# Patient Record
Sex: FEMALE | Race: WHITE | NOT HISPANIC OR LATINO | URBAN - METROPOLITAN AREA
[De-identification: names, ages, dates, MRNs, and addresses within clinical notes are randomized per-mention and may not be internally consistent; named-entity substitution may affect disease eponyms.]

---

## 2017-01-15 ENCOUNTER — EMERGENCY (EMERGENCY)
Facility: HOSPITAL | Age: 27
LOS: 1 days | Discharge: HOME | End: 2017-01-15
Attending: EMERGENCY MEDICINE
Payer: COMMERCIAL

## 2017-01-15 VITALS
TEMPERATURE: 99 F | HEART RATE: 86 BPM | OXYGEN SATURATION: 99 % | DIASTOLIC BLOOD PRESSURE: 73 MMHG | RESPIRATION RATE: 18 BRPM | SYSTOLIC BLOOD PRESSURE: 109 MMHG

## 2017-01-15 DIAGNOSIS — Z3A.01 LESS THAN 8 WEEKS GESTATION OF PREGNANCY: ICD-10-CM

## 2017-01-15 DIAGNOSIS — O43.891 OTHER PLACENTAL DISORDERS, FIRST TRIMESTER: ICD-10-CM

## 2017-01-15 DIAGNOSIS — O21.9 VOMITING OF PREGNANCY, UNSPECIFIED: ICD-10-CM

## 2017-01-15 DIAGNOSIS — R42 DIZZINESS AND GIDDINESS: ICD-10-CM

## 2017-01-15 LAB
ALBUMIN SERPL ELPH-MCNC: 4.2 G/DL — SIGNIFICANT CHANGE UP (ref 3.3–5.2)
ALP SERPL-CCNC: 37 U/L — LOW (ref 40–120)
ALT FLD-CCNC: 12 U/L — SIGNIFICANT CHANGE UP
ANION GAP SERPL CALC-SCNC: 11 MMOL/L — SIGNIFICANT CHANGE UP (ref 5–17)
APPEARANCE UR: CLEAR — SIGNIFICANT CHANGE UP
AST SERPL-CCNC: 15 U/L — SIGNIFICANT CHANGE UP
BILIRUB SERPL-MCNC: 0.3 MG/DL — LOW (ref 0.4–2)
BILIRUB UR-MCNC: NEGATIVE — SIGNIFICANT CHANGE UP
BLD GP AB SCN SERPL QL: SIGNIFICANT CHANGE UP
BUN SERPL-MCNC: 8 MG/DL — SIGNIFICANT CHANGE UP (ref 8–20)
CALCIUM SERPL-MCNC: 9.7 MG/DL — SIGNIFICANT CHANGE UP (ref 8.6–10.2)
CHLORIDE SERPL-SCNC: 101 MMOL/L — SIGNIFICANT CHANGE UP (ref 98–107)
CO2 SERPL-SCNC: 25 MMOL/L — SIGNIFICANT CHANGE UP (ref 22–29)
COLOR SPEC: YELLOW — SIGNIFICANT CHANGE UP
CREAT SERPL-MCNC: 0.49 MG/DL — LOW (ref 0.5–1.3)
DIFF PNL FLD: ABNORMAL
EPI CELLS # UR: SIGNIFICANT CHANGE UP
GLUCOSE SERPL-MCNC: 109 MG/DL — SIGNIFICANT CHANGE UP (ref 70–115)
GLUCOSE UR QL: NEGATIVE MG/DL — SIGNIFICANT CHANGE UP
HCG UR QL: POSITIVE
HCT VFR BLD CALC: 39.2 % — SIGNIFICANT CHANGE UP (ref 37–47)
HGB BLD-MCNC: 13.1 G/DL — SIGNIFICANT CHANGE UP (ref 12–16)
KETONES UR-MCNC: ABNORMAL
LEUKOCYTE ESTERASE UR-ACNC: ABNORMAL
MCHC RBC-ENTMCNC: 30.1 PG — SIGNIFICANT CHANGE UP (ref 27–31)
MCHC RBC-ENTMCNC: 33.4 G/DL — SIGNIFICANT CHANGE UP (ref 32–36)
MCV RBC AUTO: 90.1 FL — SIGNIFICANT CHANGE UP (ref 81–99)
NITRITE UR-MCNC: NEGATIVE — SIGNIFICANT CHANGE UP
PH UR: 6.5 — SIGNIFICANT CHANGE UP (ref 4.8–8)
PLATELET # BLD AUTO: 234 K/UL — SIGNIFICANT CHANGE UP (ref 150–400)
POTASSIUM SERPL-MCNC: 3.8 MMOL/L — SIGNIFICANT CHANGE UP (ref 3.5–5.3)
POTASSIUM SERPL-SCNC: 3.8 MMOL/L — SIGNIFICANT CHANGE UP (ref 3.5–5.3)
PROT SERPL-MCNC: 7.3 G/DL — SIGNIFICANT CHANGE UP (ref 6.6–8.7)
PROT UR-MCNC: 15 MG/DL
RBC # BLD: 4.35 M/UL — LOW (ref 4.4–5.2)
RBC # FLD: 13.8 % — SIGNIFICANT CHANGE UP (ref 11–15.6)
RBC CASTS # UR COMP ASSIST: ABNORMAL /HPF (ref 0–4)
SODIUM SERPL-SCNC: 137 MMOL/L — SIGNIFICANT CHANGE UP (ref 135–145)
SP GR SPEC: 1.02 — SIGNIFICANT CHANGE UP (ref 1.01–1.02)
TYPE + AB SCN PNL BLD: SIGNIFICANT CHANGE UP
UROBILINOGEN FLD QL: 1 MG/DL
WBC # BLD: 9.22 K/UL — SIGNIFICANT CHANGE UP (ref 4.8–10.8)
WBC # FLD AUTO: 9.22 K/UL — SIGNIFICANT CHANGE UP (ref 4.8–10.8)
WBC UR QL: SIGNIFICANT CHANGE UP

## 2017-01-15 PROCEDURE — 84702 CHORIONIC GONADOTROPIN TEST: CPT

## 2017-01-15 PROCEDURE — 76817 TRANSVAGINAL US OBSTETRIC: CPT | Mod: 26

## 2017-01-15 PROCEDURE — 81001 URINALYSIS AUTO W/SCOPE: CPT

## 2017-01-15 PROCEDURE — 76817 TRANSVAGINAL US OBSTETRIC: CPT

## 2017-01-15 PROCEDURE — 86900 BLOOD TYPING SEROLOGIC ABO: CPT

## 2017-01-15 PROCEDURE — 76801 OB US < 14 WKS SINGLE FETUS: CPT | Mod: 26

## 2017-01-15 PROCEDURE — 86850 RBC ANTIBODY SCREEN: CPT

## 2017-01-15 PROCEDURE — 85027 COMPLETE CBC AUTOMATED: CPT

## 2017-01-15 PROCEDURE — 86901 BLOOD TYPING SEROLOGIC RH(D): CPT

## 2017-01-15 PROCEDURE — 81025 URINE PREGNANCY TEST: CPT

## 2017-01-15 PROCEDURE — 80053 COMPREHEN METABOLIC PANEL: CPT

## 2017-01-15 PROCEDURE — 99284 EMERGENCY DEPT VISIT MOD MDM: CPT | Mod: 25

## 2017-01-15 PROCEDURE — 76801 OB US < 14 WKS SINGLE FETUS: CPT

## 2017-01-15 PROCEDURE — 99284 EMERGENCY DEPT VISIT MOD MDM: CPT

## 2017-01-15 NOTE — ED ADULT TRIAGE NOTE - CHIEF COMPLAINT QUOTE
Patient arrived to ED today with c/o dizziness, and near syncopal episode this morning.  Patient has had light brown spotting, nausea and vomiting over the past couple of days.

## 2017-01-15 NOTE — ED STATDOCS - ATTENDING CONTRIBUTION TO CARE
I, Barber Gregory, performed the initial face to face bedside interview with this patient regarding history of present illness, review of symptoms and relevant past medical, social and family history.  I completed an independent physical examination.  I was the initial provider who evaluated this patient. I have signed out the follow up of any pending tests (i.e. labs, radiological studies) to the ACP.  I have communicated the patient’s plan of care and disposition with the ACP.  The history, relevant review of systems, past medical and surgical history, medical decision making, and physical examination was documented by the scribe in my presence and I attest to the accuracy of the documentation.

## 2017-01-15 NOTE — ED STATDOCS - OBJECTIVE STATEMENT
A 26 year old female pt presents to the ED c/o dizziness. The pt is 7 weeks pregnant and had a normal US on Jan 12 th that showed an IUP. Since then she has had an episode of vaginal bleeding with a clot being passed and associated cramps. This is his first pregnancy. The pt reports developing nausea and vomiting 1 day ago and also had been experiencing light brown spotting, which she was told by her Doctor was normal. She states that she was in the shower this morning and became extremely dizzy, almost passing out. Pt also reports passing a brown clot this morning. She has been experiencing mild abdominal cramping over the past few days. Pt is pregnant for the first time. No further complaints at this time. A 26 year old female pt presents to the ED c/o dizziness. The pt is 7 weeks pregnant and had a normal US on Jan 12 th that showed an IUP. The pt reports developing nausea and vomiting 1 day ago and also had been experiencing light brown spotting, which she was told by her Doctor was normal. She states that she was in the shower this morning and became extremely dizzy, almost passing out. Pt also reports passing a brown clot this morning. She has been experiencing mild abdominal cramping over the past few days. Pt is pregnant for the first time. No further complaints at this time.

## 2017-01-15 NOTE — ED STATDOCS - MEDICAL DECISION MAKING DETAILS
A 26 year oldThe pt is 7 weeks pregnant and had a normal US on Jan 12 th that showed an IUP. Since then she has had an episode of vaginal bleeding with a clot being passed and associated cramps. This is his first pregnancy. A 26 year old female pt presents to the ED c/o dizziness. The pt is 7 weeks pregnant and had a normal US on Jan 12 th that showed an IUP. Since then she has had an episode of vaginal bleeding with a clot being passed and associated cramps. This is his first pregnancy. Will check labs, US abd, and re-evaluate.

## 2017-01-15 NOTE — ED STATDOCS - NS ED MD SCRIBE ATTENDING SCRIBE SECTIONS
HIV/PHYSICAL EXAM/DISPOSITION/PAST MEDICAL/SURGICAL/SOCIAL HISTORY/REVIEW OF SYSTEMS/HISTORY OF PRESENT ILLNESS/VITAL SIGNS( Pullset)/INTAKE ASSESSMENT/SCREENINGS

## 2017-01-15 NOTE — ED STATDOCS - PROGRESS NOTE DETAILS
NP NOTE: Pt seen by intake physician and orders/plan evaluated.  Sub: pt  with vaginal bleeding, approx 7 weeks preg, no reported trauma, fever, urinary symptoms  Obj: awake, alert, oriented, in NAD, abd soft supple non-tender  Plan: reviewed labs, US with patient, [facility]  Discussed all results with patient. Pt agreebale with plan. Questions answered.

## 2017-01-28 PROBLEM — Z00.00 ENCOUNTER FOR PREVENTIVE HEALTH EXAMINATION: Status: ACTIVE | Noted: 2017-01-28

## 2017-02-14 ENCOUNTER — APPOINTMENT (OUTPATIENT)
Dept: ANTEPARTUM | Facility: CLINIC | Age: 27
End: 2017-02-14

## 2017-02-14 ENCOUNTER — ASOB RESULT (OUTPATIENT)
Age: 27
End: 2017-02-14

## 2017-03-09 ENCOUNTER — APPOINTMENT (OUTPATIENT)
Age: 27
End: 2017-03-09

## 2017-04-11 ENCOUNTER — APPOINTMENT (OUTPATIENT)
Dept: ANTEPARTUM | Facility: CLINIC | Age: 27
End: 2017-04-11

## 2017-04-11 ENCOUNTER — ASOB RESULT (OUTPATIENT)
Age: 27
End: 2017-04-11

## 2017-05-17 ENCOUNTER — EMERGENCY (EMERGENCY)
Facility: HOSPITAL | Age: 27
LOS: 1 days | Discharge: HOME | End: 2017-05-17
Attending: EMERGENCY MEDICINE | Admitting: EMERGENCY MEDICINE
Payer: COMMERCIAL

## 2017-05-17 ENCOUNTER — OUTPATIENT (OUTPATIENT)
Dept: OUTPATIENT SERVICES | Facility: HOSPITAL | Age: 27
LOS: 1 days | End: 2017-05-17
Payer: COMMERCIAL

## 2017-05-17 VITALS
SYSTOLIC BLOOD PRESSURE: 116 MMHG | TEMPERATURE: 98 F | RESPIRATION RATE: 17 BRPM | OXYGEN SATURATION: 100 % | DIASTOLIC BLOOD PRESSURE: 62 MMHG | HEART RATE: 100 BPM

## 2017-05-17 VITALS
TEMPERATURE: 98 F | SYSTOLIC BLOOD PRESSURE: 118 MMHG | OXYGEN SATURATION: 97 % | HEART RATE: 103 BPM | RESPIRATION RATE: 18 BRPM | DIASTOLIC BLOOD PRESSURE: 73 MMHG

## 2017-05-17 DIAGNOSIS — O26.899 OTHER SPECIFIED PREGNANCY RELATED CONDITIONS, UNSPECIFIED TRIMESTER: ICD-10-CM

## 2017-05-17 DIAGNOSIS — R00.0 TACHYCARDIA, UNSPECIFIED: ICD-10-CM

## 2017-05-17 DIAGNOSIS — R63.0 ANOREXIA: ICD-10-CM

## 2017-05-17 DIAGNOSIS — R55 SYNCOPE AND COLLAPSE: ICD-10-CM

## 2017-05-17 DIAGNOSIS — Z3A.00 WEEKS OF GESTATION OF PREGNANCY NOT SPECIFIED: ICD-10-CM

## 2017-05-17 DIAGNOSIS — Z3A.25 25 WEEKS GESTATION OF PREGNANCY: ICD-10-CM

## 2017-05-17 DIAGNOSIS — O99.89 OTHER SPECIFIED DISEASES AND CONDITIONS COMPLICATING PREGNANCY, CHILDBIRTH AND THE PUERPERIUM: ICD-10-CM

## 2017-05-17 DIAGNOSIS — R42 DIZZINESS AND GIDDINESS: ICD-10-CM

## 2017-05-17 LAB
ALBUMIN SERPL ELPH-MCNC: 3.8 G/DL — SIGNIFICANT CHANGE UP (ref 3.3–5)
ALP SERPL-CCNC: 45 U/L — SIGNIFICANT CHANGE UP (ref 40–120)
ALT FLD-CCNC: 9 U/L RC — LOW (ref 10–45)
ANION GAP SERPL CALC-SCNC: 14 MMOL/L — SIGNIFICANT CHANGE UP (ref 5–17)
APPEARANCE UR: CLEAR — SIGNIFICANT CHANGE UP
AST SERPL-CCNC: 14 U/L — SIGNIFICANT CHANGE UP (ref 10–40)
BASOPHILS # BLD AUTO: 0 K/UL — SIGNIFICANT CHANGE UP (ref 0–0.2)
BASOPHILS NFR BLD AUTO: 0.3 % — SIGNIFICANT CHANGE UP (ref 0–2)
BILIRUB SERPL-MCNC: 0.2 MG/DL — SIGNIFICANT CHANGE UP (ref 0.2–1.2)
BILIRUB UR-MCNC: NEGATIVE — SIGNIFICANT CHANGE UP
BUN SERPL-MCNC: 12 MG/DL — SIGNIFICANT CHANGE UP (ref 7–23)
CALCIUM SERPL-MCNC: 9.1 MG/DL — SIGNIFICANT CHANGE UP (ref 8.4–10.5)
CHLORIDE SERPL-SCNC: 104 MMOL/L — SIGNIFICANT CHANGE UP (ref 96–108)
CO2 SERPL-SCNC: 22 MMOL/L — SIGNIFICANT CHANGE UP (ref 22–31)
COLOR SPEC: YELLOW — SIGNIFICANT CHANGE UP
CREAT SERPL-MCNC: 0.56 MG/DL — SIGNIFICANT CHANGE UP (ref 0.5–1.3)
DIFF PNL FLD: NEGATIVE — SIGNIFICANT CHANGE UP
EOSINOPHIL # BLD AUTO: 0.1 K/UL — SIGNIFICANT CHANGE UP (ref 0–0.5)
EOSINOPHIL NFR BLD AUTO: 0.4 % — SIGNIFICANT CHANGE UP (ref 0–6)
EPI CELLS # UR: SIGNIFICANT CHANGE UP /HPF
GLUCOSE SERPL-MCNC: 113 MG/DL — HIGH (ref 70–99)
GLUCOSE UR QL: NEGATIVE — SIGNIFICANT CHANGE UP
HCT VFR BLD CALC: 35.3 % — SIGNIFICANT CHANGE UP (ref 34.5–45)
HGB BLD-MCNC: 12.4 G/DL — SIGNIFICANT CHANGE UP (ref 11.5–15.5)
HYALINE CASTS # UR AUTO: ABNORMAL
KETONES UR-MCNC: ABNORMAL
LEUKOCYTE ESTERASE UR-ACNC: ABNORMAL
LYMPHOCYTES # BLD AUTO: 16.9 % — SIGNIFICANT CHANGE UP (ref 13–44)
LYMPHOCYTES # BLD AUTO: 2.1 K/UL — SIGNIFICANT CHANGE UP (ref 1–3.3)
MCHC RBC-ENTMCNC: 33 PG — SIGNIFICANT CHANGE UP (ref 27–34)
MCHC RBC-ENTMCNC: 35.1 GM/DL — SIGNIFICANT CHANGE UP (ref 32–36)
MCV RBC AUTO: 94.1 FL — SIGNIFICANT CHANGE UP (ref 80–100)
MONOCYTES # BLD AUTO: 0.7 K/UL — SIGNIFICANT CHANGE UP (ref 0–0.9)
MONOCYTES NFR BLD AUTO: 5.8 % — SIGNIFICANT CHANGE UP (ref 2–14)
NEUTROPHILS # BLD AUTO: 9.6 K/UL — HIGH (ref 1.8–7.4)
NEUTROPHILS NFR BLD AUTO: 76.5 % — SIGNIFICANT CHANGE UP (ref 43–77)
NITRITE UR-MCNC: NEGATIVE — SIGNIFICANT CHANGE UP
PH UR: 6 — SIGNIFICANT CHANGE UP (ref 5–8)
PLATELET # BLD AUTO: 218 K/UL — SIGNIFICANT CHANGE UP (ref 150–400)
POTASSIUM SERPL-MCNC: 3.3 MMOL/L — LOW (ref 3.5–5.3)
POTASSIUM SERPL-SCNC: 3.3 MMOL/L — LOW (ref 3.5–5.3)
PROT SERPL-MCNC: 6.6 G/DL — SIGNIFICANT CHANGE UP (ref 6–8.3)
PROT UR-MCNC: 100 MG/DL
RBC # BLD: 3.75 M/UL — LOW (ref 3.8–5.2)
RBC # FLD: 12 % — SIGNIFICANT CHANGE UP (ref 10.3–14.5)
RBC CASTS # UR COMP ASSIST: SIGNIFICANT CHANGE UP /HPF (ref 0–2)
SODIUM SERPL-SCNC: 140 MMOL/L — SIGNIFICANT CHANGE UP (ref 135–145)
SP GR SPEC: >1.03 — HIGH (ref 1.01–1.02)
UROBILINOGEN FLD QL: 1
WBC # BLD: 12.5 K/UL — HIGH (ref 3.8–10.5)
WBC # FLD AUTO: 12.5 K/UL — HIGH (ref 3.8–10.5)
WBC UR QL: SIGNIFICANT CHANGE UP /HPF (ref 0–5)

## 2017-05-17 PROCEDURE — 93005 ELECTROCARDIOGRAM TRACING: CPT

## 2017-05-17 PROCEDURE — 80053 COMPREHEN METABOLIC PANEL: CPT

## 2017-05-17 PROCEDURE — 99284 EMERGENCY DEPT VISIT MOD MDM: CPT | Mod: 25

## 2017-05-17 PROCEDURE — 81001 URINALYSIS AUTO W/SCOPE: CPT

## 2017-05-17 PROCEDURE — 59025 FETAL NON-STRESS TEST: CPT

## 2017-05-17 PROCEDURE — 93010 ELECTROCARDIOGRAM REPORT: CPT

## 2017-05-17 PROCEDURE — 87086 URINE CULTURE/COLONY COUNT: CPT

## 2017-05-17 PROCEDURE — G0463: CPT

## 2017-05-17 PROCEDURE — 85027 COMPLETE CBC AUTOMATED: CPT

## 2017-05-17 RX ORDER — POTASSIUM CHLORIDE 20 MEQ
40 PACKET (EA) ORAL ONCE
Qty: 0 | Refills: 0 | Status: COMPLETED | OUTPATIENT
Start: 2017-05-17 | End: 2017-05-17

## 2017-05-17 RX ORDER — SODIUM CHLORIDE 9 MG/ML
1000 INJECTION INTRAMUSCULAR; INTRAVENOUS; SUBCUTANEOUS ONCE
Qty: 0 | Refills: 0 | Status: COMPLETED | OUTPATIENT
Start: 2017-05-17 | End: 2017-05-17

## 2017-05-17 RX ADMIN — SODIUM CHLORIDE 1000 MILLILITER(S): 9 INJECTION INTRAMUSCULAR; INTRAVENOUS; SUBCUTANEOUS at 04:32

## 2017-05-17 RX ADMIN — Medication 40 MILLIEQUIVALENT(S): at 06:26

## 2017-05-17 RX ADMIN — SODIUM CHLORIDE 1000 MILLILITER(S): 9 INJECTION INTRAMUSCULAR; INTRAVENOUS; SUBCUTANEOUS at 05:43

## 2017-05-17 NOTE — ED PROVIDER NOTE - OBJECTIVE STATEMENT
27yo female  at 25 weeks by US p/w lightheadedness for 1 day. 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 She reports throughout her shift she has felt lightheaded. She reports feeling somewhat dehydrated. Pt. has history of subchoronic hemorrhage during this pregnancy for which she was on bedrest. Denies fevers, dysuria, back pain, nausea, vomiting, chest pain, shortness of breath. 27yo female  at 25 weeks by US p/w lightheadedness for 1 day. 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 She reports throughout her shift she has felt lightheaded. She reports feeling somewhat dehydrated. Pt. has history of subchoronic hemorrhage during this pregnancy for which she was on bedrest. Denies fevers, dysuria, back pain, nausea, vomiting, chest pain, shortness of breath, vaginal bleeding. 25yo female  at 25 weeks by US p/w lightheadedness for 1 day. 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 She reports throughout her shift she has felt lightheaded. She reports feeling somewhat dehydrated. Pt. has history of subchoronic hemorrhage during this pregnancy for which she was on bedrest. Reports some decreased PO fluids recently. Denies fevers, dysuria, back pain, nausea, vomiting, chest pain, shortness of breath, vaginal bleeding. 25yo female  at 25 weeks by US p/w lightheadedness for 1 day. 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 She reports throughout her shift she has felt lightheaded. She reports feeling somewhat dehydrated. Pt. has history of subchoronic hematoma during this pregnancy for which she was on bedrest. Reports some decreased PO fluids recently. Denies fevers, dysuria, back pain, nausea, vomiting, chest pain, shortness of breath, vaginal bleeding.

## 2017-05-17 NOTE — ED PROVIDER NOTE - MEDICAL DECISION MAKING DETAILS
27yo female  at 25 weeks by US p/w lightheadedness for 1 day. 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 Decreased PO intake. PE: Neuro intact, tachycardia at 103. MOst likely 2/2 dehydration. WIll obtain cbc, cmp, urine, ekg, reassess.

## 2017-05-17 NOTE — ED PROVIDER NOTE - ATTENDING CONTRIBUTION TO CARE
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  Improved with squatting down.  Reporting limited water intake recently.  Denying HA, chest pains, abdominal pains, N/V, dysuria, vaginal bleeding.  Now reporting feeling improved.    On exam patient VS WNL, RRR S1/S2, CTABL, abdomen gravid, neurologically intact.    Possible dehydration/orthostatic near syncope - will check screening labs, FHR, IVF, EKG, UA - if remains well appearing and ED workup unremarkable will DC to L&D for fetal evaluation.

## 2017-05-17 NOTE — ED PROVIDER NOTE - PLAN OF CARE
You were seen in the Emergency Department for lightheadedness. Your examination and lab tests were reassuring. You were most likely dehydrated. Drink plenty of fluids. Please follow up with your obstetrician as soon as possible for further evaluation. Please return to the Emergency Department if you have any new concerning symptoms such as severe pain, weakness, vaginal bleeding, or any other concerning symptoms.

## 2017-05-17 NOTE — ED PROVIDER NOTE - CARE PLAN
Instructions for follow-up, activity and diet:	You were seen in the Emergency Department for lightheadedness. Your examination and lab tests were reassuring. You were most likely dehydrated. Drink plenty of fluids. Please follow up with your obstetrician as soon as possible for further evaluation. Please return to the Emergency Department if you have any new concerning symptoms such as severe pain, weakness, vaginal bleeding, or any other concerning symptoms. Principal Discharge DX:	Near syncope  Instructions for follow-up, activity and diet:	You were seen in the Emergency Department for lightheadedness. Your examination and lab tests were reassuring. You were most likely dehydrated. Drink plenty of fluids. Please follow up with your obstetrician as soon as possible for further evaluation. Please return to the Emergency Department if you have any new concerning symptoms such as severe pain, weakness, vaginal bleeding, or any other concerning symptoms.

## 2017-05-18 LAB
CULTURE RESULTS: SIGNIFICANT CHANGE UP
SPECIMEN SOURCE: SIGNIFICANT CHANGE UP

## 2017-07-03 ENCOUNTER — APPOINTMENT (OUTPATIENT)
Dept: ANTEPARTUM | Facility: CLINIC | Age: 27
End: 2017-07-03

## 2017-07-03 ENCOUNTER — ASOB RESULT (OUTPATIENT)
Age: 27
End: 2017-07-03

## 2017-07-27 ENCOUNTER — OUTPATIENT (OUTPATIENT)
Dept: OUTPATIENT SERVICES | Facility: HOSPITAL | Age: 27
LOS: 1 days | End: 2017-07-27
Payer: COMMERCIAL

## 2017-07-27 DIAGNOSIS — Z00.00 ENCOUNTER FOR GENERAL ADULT MEDICAL EXAMINATION WITHOUT ABNORMAL FINDINGS: ICD-10-CM

## 2017-07-27 PROCEDURE — 86480 TB TEST CELL IMMUN MEASURE: CPT

## 2017-07-27 PROCEDURE — 36415 COLL VENOUS BLD VENIPUNCTURE: CPT

## 2017-07-28 LAB
M TB TUBERC IFN-G BLD QL: 0.01 IU/ML — SIGNIFICANT CHANGE UP
M TB TUBERC IFN-G BLD QL: 0.06 IU/ML — SIGNIFICANT CHANGE UP
M TB TUBERC IFN-G BLD QL: NEGATIVE — SIGNIFICANT CHANGE UP
MITOGEN IGNF BCKGRD COR BLD-ACNC: >10 IU/ML — SIGNIFICANT CHANGE UP

## 2017-08-01 ENCOUNTER — APPOINTMENT (OUTPATIENT)
Dept: ANTEPARTUM | Facility: CLINIC | Age: 27
End: 2017-08-01

## 2017-08-16 ENCOUNTER — APPOINTMENT (OUTPATIENT)
Dept: ANTEPARTUM | Facility: CLINIC | Age: 27
End: 2017-08-16
Payer: COMMERCIAL

## 2017-08-16 ENCOUNTER — ASOB RESULT (OUTPATIENT)
Age: 27
End: 2017-08-16

## 2017-08-16 PROCEDURE — 76816 OB US FOLLOW-UP PER FETUS: CPT

## 2017-08-22 ENCOUNTER — INPATIENT (INPATIENT)
Facility: HOSPITAL | Age: 27
LOS: 2 days | Discharge: HOME | End: 2017-08-25
Attending: OBSTETRICS & GYNECOLOGY | Admitting: OBSTETRICS & GYNECOLOGY
Payer: COMMERCIAL

## 2017-08-22 DIAGNOSIS — Z34.80 ENCOUNTER FOR SUPERVISION OF OTHER NORMAL PREGNANCY, UNSPECIFIED TRIMESTER: ICD-10-CM

## 2017-08-22 DIAGNOSIS — O26.899 OTHER SPECIFIED PREGNANCY RELATED CONDITIONS, UNSPECIFIED TRIMESTER: ICD-10-CM

## 2017-08-22 DIAGNOSIS — Z3A.00 WEEKS OF GESTATION OF PREGNANCY NOT SPECIFIED: ICD-10-CM

## 2017-08-22 LAB
BASOPHILS # BLD AUTO: 0 K/UL — SIGNIFICANT CHANGE UP (ref 0–0.2)
BASOPHILS NFR BLD AUTO: 0.3 % — SIGNIFICANT CHANGE UP (ref 0–2)
EOSINOPHIL # BLD AUTO: 0.1 K/UL — SIGNIFICANT CHANGE UP (ref 0–0.5)
EOSINOPHIL NFR BLD AUTO: 0.5 % — SIGNIFICANT CHANGE UP (ref 0–6)
HCT VFR BLD CALC: 36.7 % — SIGNIFICANT CHANGE UP (ref 34.5–45)
HGB BLD-MCNC: 12.2 G/DL — SIGNIFICANT CHANGE UP (ref 11.5–15.5)
LYMPHOCYTES # BLD AUTO: 17.6 % — SIGNIFICANT CHANGE UP (ref 13–44)
LYMPHOCYTES # BLD AUTO: 2.4 K/UL — SIGNIFICANT CHANGE UP (ref 1–3.3)
MCHC RBC-ENTMCNC: 31.1 PG — SIGNIFICANT CHANGE UP (ref 27–34)
MCHC RBC-ENTMCNC: 33.4 GM/DL — SIGNIFICANT CHANGE UP (ref 32–36)
MCV RBC AUTO: 93.3 FL — SIGNIFICANT CHANGE UP (ref 80–100)
MONOCYTES # BLD AUTO: 0.8 K/UL — SIGNIFICANT CHANGE UP (ref 0–0.9)
MONOCYTES NFR BLD AUTO: 5.8 % — SIGNIFICANT CHANGE UP (ref 2–14)
NEUTROPHILS # BLD AUTO: 10.6 K/UL — HIGH (ref 1.8–7.4)
NEUTROPHILS NFR BLD AUTO: 75.8 % — SIGNIFICANT CHANGE UP (ref 43–77)
PLATELET # BLD AUTO: 186 K/UL — SIGNIFICANT CHANGE UP (ref 150–400)
RBC # BLD: 3.93 M/UL — SIGNIFICANT CHANGE UP (ref 3.8–5.2)
RBC # FLD: 13.5 % — SIGNIFICANT CHANGE UP (ref 10.3–14.5)
WBC # BLD: 13.9 K/UL — HIGH (ref 3.8–10.5)
WBC # FLD AUTO: 13.9 K/UL — HIGH (ref 3.8–10.5)

## 2017-08-22 RX ORDER — SODIUM CHLORIDE 9 MG/ML
1000 INJECTION, SOLUTION INTRAVENOUS
Qty: 0 | Refills: 0 | Status: DISCONTINUED | OUTPATIENT
Start: 2017-08-22 | End: 2017-08-23

## 2017-08-22 RX ORDER — CITRIC ACID/SODIUM CITRATE 300-500 MG
15 SOLUTION, ORAL ORAL EVERY 4 HOURS
Qty: 0 | Refills: 0 | Status: DISCONTINUED | OUTPATIENT
Start: 2017-08-22 | End: 2017-08-23

## 2017-08-22 RX ORDER — PENICILLIN G POTASSIUM 5000000 [IU]/1
5 POWDER, FOR SOLUTION INTRAMUSCULAR; INTRAPLEURAL; INTRATHECAL; INTRAVENOUS ONCE
Qty: 0 | Refills: 0 | Status: COMPLETED | OUTPATIENT
Start: 2017-08-22 | End: 2017-08-23

## 2017-08-22 RX ORDER — PENICILLIN G POTASSIUM 5000000 [IU]/1
2.5 POWDER, FOR SOLUTION INTRAMUSCULAR; INTRAPLEURAL; INTRATHECAL; INTRAVENOUS EVERY 4 HOURS
Qty: 0 | Refills: 0 | Status: DISCONTINUED | OUTPATIENT
Start: 2017-08-23 | End: 2017-08-23

## 2017-08-22 RX ORDER — OXYTOCIN 10 UNIT/ML
4 VIAL (ML) INJECTION
Qty: 30 | Refills: 0 | Status: DISCONTINUED | OUTPATIENT
Start: 2017-08-22 | End: 2017-08-25

## 2017-08-22 RX ORDER — PENICILLIN G POTASSIUM 5000000 [IU]/1
POWDER, FOR SOLUTION INTRAMUSCULAR; INTRAPLEURAL; INTRATHECAL; INTRAVENOUS
Qty: 0 | Refills: 0 | Status: DISCONTINUED | OUTPATIENT
Start: 2017-08-23 | End: 2017-08-23

## 2017-08-22 RX ORDER — SODIUM CHLORIDE 9 MG/ML
500 INJECTION, SOLUTION INTRAVENOUS ONCE
Qty: 0 | Refills: 0 | Status: COMPLETED | OUTPATIENT
Start: 2017-08-22 | End: 2017-08-22

## 2017-08-22 RX ORDER — OXYTOCIN 10 UNIT/ML
333.33 VIAL (ML) INJECTION
Qty: 20 | Refills: 0 | Status: DISCONTINUED | OUTPATIENT
Start: 2017-08-22 | End: 2017-08-23

## 2017-08-22 RX ADMIN — SODIUM CHLORIDE 1000 MILLILITER(S): 9 INJECTION, SOLUTION INTRAVENOUS at 23:30

## 2017-08-23 VITALS — WEIGHT: 176.37 LBS | HEIGHT: 62 IN

## 2017-08-23 LAB
BLD GP AB SCN SERPL QL: NEGATIVE — SIGNIFICANT CHANGE UP
RH IG SCN BLD-IMP: POSITIVE — SIGNIFICANT CHANGE UP
RH IG SCN BLD-IMP: POSITIVE — SIGNIFICANT CHANGE UP
T PALLIDUM AB TITR SER: NEGATIVE — SIGNIFICANT CHANGE UP

## 2017-08-23 RX ORDER — AER TRAVELER 0.5 G/1
1 SOLUTION RECTAL; TOPICAL EVERY 4 HOURS
Qty: 0 | Refills: 0 | Status: DISCONTINUED | OUTPATIENT
Start: 2017-08-23 | End: 2017-08-25

## 2017-08-23 RX ORDER — AER TRAVELER 0.5 G/1
1 SOLUTION RECTAL; TOPICAL EVERY 4 HOURS
Qty: 0 | Refills: 0 | Status: DISCONTINUED | OUTPATIENT
Start: 2017-08-23 | End: 2017-08-23

## 2017-08-23 RX ORDER — PRAMOXINE HYDROCHLORIDE 150 MG/15G
1 AEROSOL, FOAM RECTAL EVERY 4 HOURS
Qty: 0 | Refills: 0 | Status: DISCONTINUED | OUTPATIENT
Start: 2017-08-23 | End: 2017-08-23

## 2017-08-23 RX ORDER — DIPHENHYDRAMINE HCL 50 MG
25 CAPSULE ORAL EVERY 6 HOURS
Qty: 0 | Refills: 0 | Status: DISCONTINUED | OUTPATIENT
Start: 2017-08-23 | End: 2017-08-25

## 2017-08-23 RX ORDER — PRAMOXINE HYDROCHLORIDE 150 MG/15G
1 AEROSOL, FOAM RECTAL EVERY 4 HOURS
Qty: 0 | Refills: 0 | Status: DISCONTINUED | OUTPATIENT
Start: 2017-08-23 | End: 2017-08-25

## 2017-08-23 RX ORDER — OXYTOCIN 10 UNIT/ML
41.67 VIAL (ML) INJECTION
Qty: 20 | Refills: 0 | Status: DISCONTINUED | OUTPATIENT
Start: 2017-08-23 | End: 2017-08-25

## 2017-08-23 RX ORDER — OXYCODONE HYDROCHLORIDE 5 MG/1
5 TABLET ORAL EVERY 4 HOURS
Qty: 0 | Refills: 0 | Status: DISCONTINUED | OUTPATIENT
Start: 2017-08-23 | End: 2017-08-25

## 2017-08-23 RX ORDER — IBUPROFEN 200 MG
600 TABLET ORAL EVERY 6 HOURS
Qty: 0 | Refills: 0 | Status: COMPLETED | OUTPATIENT
Start: 2017-08-23 | End: 2018-07-22

## 2017-08-23 RX ORDER — SODIUM CHLORIDE 9 MG/ML
3 INJECTION INTRAMUSCULAR; INTRAVENOUS; SUBCUTANEOUS EVERY 8 HOURS
Qty: 0 | Refills: 0 | Status: DISCONTINUED | OUTPATIENT
Start: 2017-08-23 | End: 2017-08-25

## 2017-08-23 RX ORDER — HYDROCORTISONE 1 %
1 OINTMENT (GRAM) TOPICAL EVERY 4 HOURS
Qty: 0 | Refills: 0 | Status: DISCONTINUED | OUTPATIENT
Start: 2017-08-23 | End: 2017-08-23

## 2017-08-23 RX ORDER — SODIUM CHLORIDE 9 MG/ML
3 INJECTION INTRAMUSCULAR; INTRAVENOUS; SUBCUTANEOUS EVERY 8 HOURS
Qty: 0 | Refills: 0 | Status: DISCONTINUED | OUTPATIENT
Start: 2017-08-23 | End: 2017-08-23

## 2017-08-23 RX ORDER — IBUPROFEN 200 MG
600 TABLET ORAL EVERY 6 HOURS
Qty: 0 | Refills: 0 | Status: DISCONTINUED | OUTPATIENT
Start: 2017-08-23 | End: 2017-08-25

## 2017-08-23 RX ORDER — GLYCERIN ADULT
1 SUPPOSITORY, RECTAL RECTAL AT BEDTIME
Qty: 0 | Refills: 0 | Status: DISCONTINUED | OUTPATIENT
Start: 2017-08-23 | End: 2017-08-25

## 2017-08-23 RX ORDER — DIBUCAINE 1 %
1 OINTMENT (GRAM) RECTAL EVERY 4 HOURS
Qty: 0 | Refills: 0 | Status: DISCONTINUED | OUTPATIENT
Start: 2017-08-23 | End: 2017-08-25

## 2017-08-23 RX ORDER — SIMETHICONE 80 MG/1
80 TABLET, CHEWABLE ORAL EVERY 6 HOURS
Qty: 0 | Refills: 0 | Status: DISCONTINUED | OUTPATIENT
Start: 2017-08-23 | End: 2017-08-25

## 2017-08-23 RX ORDER — HYDROCORTISONE 1 %
1 OINTMENT (GRAM) TOPICAL EVERY 4 HOURS
Qty: 0 | Refills: 0 | Status: DISCONTINUED | OUTPATIENT
Start: 2017-08-23 | End: 2017-08-25

## 2017-08-23 RX ORDER — ACETAMINOPHEN 500 MG
975 TABLET ORAL EVERY 6 HOURS
Qty: 0 | Refills: 0 | Status: DISCONTINUED | OUTPATIENT
Start: 2017-08-23 | End: 2017-08-25

## 2017-08-23 RX ORDER — TETANUS TOXOID, REDUCED DIPHTHERIA TOXOID AND ACELLULAR PERTUSSIS VACCINE, ADSORBED 5; 2.5; 8; 8; 2.5 [IU]/.5ML; [IU]/.5ML; UG/.5ML; UG/.5ML; UG/.5ML
0.5 SUSPENSION INTRAMUSCULAR ONCE
Qty: 0 | Refills: 0 | Status: DISCONTINUED | OUTPATIENT
Start: 2017-08-23 | End: 2017-08-25

## 2017-08-23 RX ORDER — ACETAMINOPHEN 500 MG
975 TABLET ORAL EVERY 6 HOURS
Qty: 0 | Refills: 0 | Status: COMPLETED | OUTPATIENT
Start: 2017-08-23 | End: 2018-07-22

## 2017-08-23 RX ORDER — MAGNESIUM HYDROXIDE 400 MG/1
30 TABLET, CHEWABLE ORAL
Qty: 0 | Refills: 0 | Status: DISCONTINUED | OUTPATIENT
Start: 2017-08-23 | End: 2017-08-25

## 2017-08-23 RX ORDER — LANOLIN
1 OINTMENT (GRAM) TOPICAL EVERY 6 HOURS
Qty: 0 | Refills: 0 | Status: DISCONTINUED | OUTPATIENT
Start: 2017-08-23 | End: 2017-08-25

## 2017-08-23 RX ORDER — KETOROLAC TROMETHAMINE 30 MG/ML
30 SYRINGE (ML) INJECTION ONCE
Qty: 0 | Refills: 0 | Status: DISCONTINUED | OUTPATIENT
Start: 2017-08-23 | End: 2017-08-23

## 2017-08-23 RX ORDER — OXYTOCIN 10 UNIT/ML
41.67 VIAL (ML) INJECTION
Qty: 20 | Refills: 0 | Status: DISCONTINUED | OUTPATIENT
Start: 2017-08-23 | End: 2017-08-23

## 2017-08-23 RX ORDER — DOCUSATE SODIUM 100 MG
100 CAPSULE ORAL
Qty: 0 | Refills: 0 | Status: DISCONTINUED | OUTPATIENT
Start: 2017-08-23 | End: 2017-08-25

## 2017-08-23 RX ORDER — OXYCODONE HYDROCHLORIDE 5 MG/1
5 TABLET ORAL
Qty: 0 | Refills: 0 | Status: DISCONTINUED | OUTPATIENT
Start: 2017-08-23 | End: 2017-08-25

## 2017-08-23 RX ORDER — DIBUCAINE 1 %
1 OINTMENT (GRAM) RECTAL EVERY 4 HOURS
Qty: 0 | Refills: 0 | Status: DISCONTINUED | OUTPATIENT
Start: 2017-08-23 | End: 2017-08-23

## 2017-08-23 RX ADMIN — Medication 125 MILLIUNIT(S)/MIN: at 09:02

## 2017-08-23 RX ADMIN — SODIUM CHLORIDE 125 MILLILITER(S): 9 INJECTION, SOLUTION INTRAVENOUS at 01:28

## 2017-08-23 RX ADMIN — Medication 4 MILLIUNIT(S)/MIN: at 03:39

## 2017-08-23 RX ADMIN — Medication 600 MILLIGRAM(S): at 12:45

## 2017-08-23 RX ADMIN — Medication 1 TABLET(S): at 13:38

## 2017-08-23 RX ADMIN — PENICILLIN G POTASSIUM 200 MILLION UNIT(S): 5000000 POWDER, FOR SOLUTION INTRAMUSCULAR; INTRAPLEURAL; INTRATHECAL; INTRAVENOUS at 04:35

## 2017-08-23 RX ADMIN — Medication 30 MILLIGRAM(S): at 06:42

## 2017-08-23 RX ADMIN — PENICILLIN G POTASSIUM 200 MILLION UNIT(S): 5000000 POWDER, FOR SOLUTION INTRAMUSCULAR; INTRAPLEURAL; INTRATHECAL; INTRAVENOUS at 00:05

## 2017-08-23 RX ADMIN — Medication 975 MILLIGRAM(S): at 23:43

## 2017-08-23 RX ADMIN — Medication 600 MILLIGRAM(S): at 23:44

## 2017-08-23 RX ADMIN — Medication 975 MILLIGRAM(S): at 18:11

## 2017-08-23 RX ADMIN — Medication 600 MILLIGRAM(S): at 18:11

## 2017-08-23 RX ADMIN — Medication 125 MILLIUNIT(S)/MIN: at 05:50

## 2017-08-23 RX ADMIN — OXYCODONE HYDROCHLORIDE 5 MILLIGRAM(S): 5 TABLET ORAL at 16:03

## 2017-08-23 NOTE — PATIENT PROFILE OB - TOBACCO USE
"Chief Complaint   Patient presents with     Breathing Problem     hard to breathe     Recheck Medication     please send Lasix Rx to pharmacy again - the Rx they have for her is for only 1 tabd daily and she is taking 2 tabs daily (med pended)       Initial /82 mmHg  Pulse 109  Temp(Src) 99  F (37.2  C) (Tympanic)  Resp 28  Ht 4' 11\" (1.499 m)  Wt 162 lb (73.483 kg)  BMI 32.70 kg/m2  SpO2 87% Estimated body mass index is 32.7 kg/(m^2) as calculated from the following:    Height as of this encounter: 4' 11\" (1.499 m).    Weight as of this encounter: 162 lb (73.483 kg).  Medication Reconciliation: complete   Vanda Pate LPN      "
Never smoker

## 2017-08-24 ENCOUNTER — TRANSCRIPTION ENCOUNTER (OUTPATIENT)
Age: 27
End: 2017-08-24

## 2017-08-24 LAB
HCT VFR BLD CALC: 35.1 % — SIGNIFICANT CHANGE UP (ref 34.5–45)
HGB BLD-MCNC: 11.9 G/DL — SIGNIFICANT CHANGE UP (ref 11.5–15.5)

## 2017-08-24 RX ADMIN — Medication 600 MILLIGRAM(S): at 18:53

## 2017-08-24 RX ADMIN — Medication 600 MILLIGRAM(S): at 12:01

## 2017-08-24 RX ADMIN — Medication 975 MILLIGRAM(S): at 12:01

## 2017-08-24 RX ADMIN — Medication 600 MILLIGRAM(S): at 05:26

## 2017-08-24 RX ADMIN — Medication 975 MILLIGRAM(S): at 05:26

## 2017-08-24 RX ADMIN — Medication 1 TABLET(S): at 12:01

## 2017-08-24 RX ADMIN — Medication 975 MILLIGRAM(S): at 18:54

## 2017-08-24 NOTE — DISCHARGE NOTE OB - CARE PROVIDER_API CALL
Shauna Stern), Obstetrics and Gynecology  [address]  [address]  [place] [address]  Phone: [phone]  Fax: [phone]

## 2017-08-24 NOTE — PROGRESS NOTE ADULT - SUBJECTIVE AND OBJECTIVE BOX
VS: Vital Signs Last 24 Hrs  T(C): 37 (24 Aug 2017 05:00), Max: 37.1 (23 Aug 2017 11:00)  T(F): 98.6 (24 Aug 2017 05:00), Max: 98.7 (23 Aug 2017 11:00)  HR: 97 (24 Aug 2017 05:00) (73 - 98)  BP: 114/74 (24 Aug 2017 05:00) (105/69 - 114/74)  BP(mean): --  RR: 18 (24 Aug 2017 05:00) (18 - 20)  SpO2: 99% (24 Aug 2017 00:33) (98% - 99%)    Abdomen soft, fundus firm  Lochia WNL  Voiding, stable

## 2017-08-24 NOTE — DISCHARGE NOTE OB - MATERIALS PROVIDED
Back To Sleep Handout/Breastfeeding Log/Breastfeeding Guide and Packet/Guide to Postpartum Care/[employer]  Immunization Record/Breastfeeding Mother’s Support Group Information/[facility] [employer] Screening Program

## 2017-08-24 NOTE — PROGRESS NOTE ADULT - SUBJECTIVE AND OBJECTIVE BOX
OB Progress Note:  PPD#1    S: 27yo  G  P PPD#1 s/p . Patient feels well. Pain is well controlled. She is tolerating a regular diet and passing flatus. She is voiding spontaneously, and ambulating without difficulty. Denies CP/SOB. Denies lightheadedness/dizziness. Denies N/V.    Pt had a lao re-inserted yesterday evening at 6pm for urinary retention. It was removed earlier this AM.     O:  Vitals:  Vital Signs Last 24 Hrs  T(C): 37 (24 Aug 2017 05:00), Max: 37.1 (23 Aug 2017 11:00)  T(F): 98.6 (24 Aug 2017 05:00), Max: 98.7 (23 Aug 2017 11:00)  HR: 97 (24 Aug 2017 05:00) (73 - 98)  BP: 114/74 (24 Aug 2017 05:00) (105/69 - 114/74)  BP(mean): --  RR: 18 (24 Aug 2017 05:00) (18 - 18)  SpO2: 99% (24 Aug 2017 00:33) (98% - 99%)    MEDICATIONS  (STANDING):  oxytocin Infusion 4 milliUNIT(s)/Min (4 mL/Hr) IV Continuous <Continuous>  sodium chloride 0.9% lock flush 3 milliLiter(s) IV Push every 8 hours  diphtheria/tetanus/pertussis (acellular) Vaccine (ADAcel) 0.5 milliLiter(s) IntraMuscular once  oxytocin Infusion 41.667 milliUNIT(s)/Min (125 mL/Hr) IV Continuous <Continuous>  prenatal multivitamin 1 Tablet(s) Oral daily  oxyCODONE    IR 5 milliGRAM(s) Oral every 3 hours  ibuprofen  Tablet 600 milliGRAM(s) Oral every 6 hours  acetaminophen   Tablet. 975 milliGRAM(s) Oral every 6 hours      Labs:  Blood type: O Positive  Rubella IgG: RPR: Negative                          12.2   13.9<H> >-----------< 186    (  @ 23:39 )             36.7                  Physical Exam:  General: NAD  Abdomen: soft, non-tender, non-distended, fundus firm  Vaginal: Lochia wnl  Extremities: No erythema/edema    A/P: 27yo   PPD#1 s/p .  Patient is stable. s/p lao for urinary retention  - Lao removed at 5:30am. Pt due to void.   - Pain well controlled, continue current pain regimen  - Increase ambulation, SCDs when not ambulating  - Continue regular diet    Shonda Crowell, PGY-1

## 2017-08-24 NOTE — DISCHARGE NOTE OB - PATIENT PORTAL LINK FT
“You can access the FollowHealth Patient Portal, offered by [facility], by registering with the following website: [email or web address]”

## 2017-08-25 VITALS
DIASTOLIC BLOOD PRESSURE: 78 MMHG | RESPIRATION RATE: 18 BRPM | TEMPERATURE: 98 F | HEART RATE: 78 BPM | SYSTOLIC BLOOD PRESSURE: 102 MMHG | OXYGEN SATURATION: 99 %

## 2017-08-25 PROCEDURE — 86900 BLOOD TYPING SEROLOGIC ABO: CPT

## 2017-08-25 PROCEDURE — 86850 RBC ANTIBODY SCREEN: CPT

## 2017-08-25 PROCEDURE — 85027 COMPLETE CBC AUTOMATED: CPT

## 2017-08-25 PROCEDURE — 86901 BLOOD TYPING SEROLOGIC RH(D): CPT

## 2017-08-25 PROCEDURE — 59050 FETAL MONITOR W/REPORT: CPT

## 2017-08-25 PROCEDURE — 59025 FETAL NON-STRESS TEST: CPT

## 2017-08-25 PROCEDURE — 85018 HEMOGLOBIN: CPT

## 2017-08-25 PROCEDURE — 86780 TREPONEMA PALLIDUM: CPT

## 2017-08-25 PROCEDURE — G0463: CPT

## 2017-08-25 RX ADMIN — Medication 600 MILLIGRAM(S): at 06:48

## 2017-08-25 RX ADMIN — Medication 975 MILLIGRAM(S): at 06:48

## 2017-08-25 RX ADMIN — Medication 975 MILLIGRAM(S): at 02:00

## 2017-08-25 RX ADMIN — Medication 600 MILLIGRAM(S): at 01:05

## 2017-08-25 RX ADMIN — Medication 975 MILLIGRAM(S): at 07:10

## 2017-08-25 RX ADMIN — Medication 600 MILLIGRAM(S): at 07:10

## 2017-08-25 RX ADMIN — Medication 975 MILLIGRAM(S): at 01:05

## 2017-08-25 RX ADMIN — Medication 600 MILLIGRAM(S): at 02:00

## 2017-08-25 NOTE — PROGRESS NOTE ADULT - SUBJECTIVE AND OBJECTIVE BOX
Vital Signs Last 24 Hrs  T(C): 36.8 (25 Aug 2017 10:03), Max: 37 (24 Aug 2017 17:33)  T(F): 98.2 (25 Aug 2017 10:03), Max: 98.6 (24 Aug 2017 17:33)  HR: 78 (25 Aug 2017 10:03) (71 - 80)  BP: 102/78 (25 Aug 2017 10:03) (102/70 - 114/76)  BP(mean): --  RR: 18 (25 Aug 2017 10:03) (18 - 18)  SpO2: 99% (25 Aug 2017 10:03) (99% - 99%)    Abdomen soft  Fundus Firm  Lochia WNL  Voiding  Stable

## 2017-08-31 ENCOUNTER — APPOINTMENT (OUTPATIENT)
Dept: ANTEPARTUM | Facility: CLINIC | Age: 27
End: 2017-08-31

## 2019-03-25 ENCOUNTER — RESULT REVIEW (OUTPATIENT)
Age: 29
End: 2019-03-25

## 2019-05-12 ENCOUNTER — LABORATORY RESULT (OUTPATIENT)
Age: 29
End: 2019-05-12

## 2019-05-13 ENCOUNTER — ASOB RESULT (OUTPATIENT)
Age: 29
End: 2019-05-13

## 2019-05-13 ENCOUNTER — APPOINTMENT (OUTPATIENT)
Dept: ANTEPARTUM | Facility: CLINIC | Age: 29
End: 2019-05-13
Payer: COMMERCIAL

## 2019-05-13 PROCEDURE — 36416 COLLJ CAPILLARY BLOOD SPEC: CPT

## 2019-05-13 PROCEDURE — 76801 OB US < 14 WKS SINGLE FETUS: CPT

## 2019-05-13 PROCEDURE — 76813 OB US NUCHAL MEAS 1 GEST: CPT

## 2019-07-11 ENCOUNTER — ASOB RESULT (OUTPATIENT)
Age: 29
End: 2019-07-11

## 2019-07-11 ENCOUNTER — APPOINTMENT (OUTPATIENT)
Dept: ANTEPARTUM | Facility: CLINIC | Age: 29
End: 2019-07-11
Payer: COMMERCIAL

## 2019-07-11 PROCEDURE — 76817 TRANSVAGINAL US OBSTETRIC: CPT

## 2019-07-11 PROCEDURE — 76811 OB US DETAILED SNGL FETUS: CPT

## 2019-11-04 ENCOUNTER — APPOINTMENT (OUTPATIENT)
Dept: ANTEPARTUM | Facility: CLINIC | Age: 29
End: 2019-11-04
Payer: COMMERCIAL

## 2019-11-04 ENCOUNTER — ASOB RESULT (OUTPATIENT)
Age: 29
End: 2019-11-04

## 2019-11-04 PROCEDURE — 76816 OB US FOLLOW-UP PER FETUS: CPT

## 2019-11-12 ENCOUNTER — INPATIENT (INPATIENT)
Facility: HOSPITAL | Age: 29
LOS: 1 days | Discharge: HOME | End: 2019-11-14
Attending: OBSTETRICS & GYNECOLOGY | Admitting: OBSTETRICS & GYNECOLOGY
Payer: COMMERCIAL

## 2019-11-12 VITALS — HEIGHT: 62 IN | WEIGHT: 187.39 LBS

## 2019-11-12 DIAGNOSIS — Z3A.00 WEEKS OF GESTATION OF PREGNANCY NOT SPECIFIED: ICD-10-CM

## 2019-11-12 DIAGNOSIS — O26.899 OTHER SPECIFIED PREGNANCY RELATED CONDITIONS, UNSPECIFIED TRIMESTER: ICD-10-CM

## 2019-11-12 DIAGNOSIS — Z34.80 ENCOUNTER FOR SUPERVISION OF OTHER NORMAL PREGNANCY, UNSPECIFIED TRIMESTER: ICD-10-CM

## 2019-11-12 LAB
BASOPHILS # BLD AUTO: 0.03 K/UL — SIGNIFICANT CHANGE UP (ref 0–0.2)
BASOPHILS NFR BLD AUTO: 0.2 % — SIGNIFICANT CHANGE UP (ref 0–2)
BLD GP AB SCN SERPL QL: NEGATIVE — SIGNIFICANT CHANGE UP
EOSINOPHIL # BLD AUTO: 0.07 K/UL — SIGNIFICANT CHANGE UP (ref 0–0.5)
EOSINOPHIL NFR BLD AUTO: 0.5 % — SIGNIFICANT CHANGE UP (ref 0–6)
HCT VFR BLD CALC: 34.4 % — LOW (ref 34.5–45)
HGB BLD-MCNC: 11.1 G/DL — LOW (ref 11.5–15.5)
IMM GRANULOCYTES NFR BLD AUTO: 0.4 % — SIGNIFICANT CHANGE UP (ref 0–1.5)
LYMPHOCYTES # BLD AUTO: 15.7 % — SIGNIFICANT CHANGE UP (ref 13–44)
LYMPHOCYTES # BLD AUTO: 2.24 K/UL — SIGNIFICANT CHANGE UP (ref 1–3.3)
MCHC RBC-ENTMCNC: 28.9 PG — SIGNIFICANT CHANGE UP (ref 27–34)
MCHC RBC-ENTMCNC: 32.3 GM/DL — SIGNIFICANT CHANGE UP (ref 32–36)
MCV RBC AUTO: 89.6 FL — SIGNIFICANT CHANGE UP (ref 80–100)
MONOCYTES # BLD AUTO: 0.89 K/UL — SIGNIFICANT CHANGE UP (ref 0–0.9)
MONOCYTES NFR BLD AUTO: 6.2 % — SIGNIFICANT CHANGE UP (ref 2–14)
NEUTROPHILS # BLD AUTO: 10.99 K/UL — HIGH (ref 1.8–7.4)
NEUTROPHILS NFR BLD AUTO: 77 % — SIGNIFICANT CHANGE UP (ref 43–77)
NRBC # BLD: 0 /100 WBCS — SIGNIFICANT CHANGE UP (ref 0–0)
PLATELET # BLD AUTO: 206 K/UL — SIGNIFICANT CHANGE UP (ref 150–400)
RBC # BLD: 3.84 M/UL — SIGNIFICANT CHANGE UP (ref 3.8–5.2)
RBC # FLD: 14.6 % — HIGH (ref 10.3–14.5)
RH IG SCN BLD-IMP: POSITIVE — SIGNIFICANT CHANGE UP
WBC # BLD: 14.28 K/UL — HIGH (ref 3.8–10.5)
WBC # FLD AUTO: 14.28 K/UL — HIGH (ref 3.8–10.5)

## 2019-11-12 RX ORDER — LANOLIN
1 OINTMENT (GRAM) TOPICAL EVERY 6 HOURS
Refills: 0 | Status: DISCONTINUED | OUTPATIENT
Start: 2019-11-12 | End: 2019-11-14

## 2019-11-12 RX ORDER — IBUPROFEN 200 MG
600 TABLET ORAL EVERY 6 HOURS
Refills: 0 | Status: COMPLETED | OUTPATIENT
Start: 2019-11-12 | End: 2020-10-10

## 2019-11-12 RX ORDER — BENZOCAINE 10 %
1 GEL (GRAM) MUCOUS MEMBRANE EVERY 6 HOURS
Refills: 0 | Status: DISCONTINUED | OUTPATIENT
Start: 2019-11-12 | End: 2019-11-14

## 2019-11-12 RX ORDER — TETANUS TOXOID, REDUCED DIPHTHERIA TOXOID AND ACELLULAR PERTUSSIS VACCINE, ADSORBED 5; 2.5; 8; 8; 2.5 [IU]/.5ML; [IU]/.5ML; UG/.5ML; UG/.5ML; UG/.5ML
0.5 SUSPENSION INTRAMUSCULAR ONCE
Refills: 0 | Status: DISCONTINUED | OUTPATIENT
Start: 2019-11-12 | End: 2019-11-14

## 2019-11-12 RX ORDER — OXYCODONE HYDROCHLORIDE 5 MG/1
5 TABLET ORAL
Refills: 0 | Status: DISCONTINUED | OUTPATIENT
Start: 2019-11-12 | End: 2019-11-14

## 2019-11-12 RX ORDER — SIMETHICONE 80 MG/1
80 TABLET, CHEWABLE ORAL EVERY 4 HOURS
Refills: 0 | Status: DISCONTINUED | OUTPATIENT
Start: 2019-11-12 | End: 2019-11-14

## 2019-11-12 RX ORDER — ACETAMINOPHEN 500 MG
975 TABLET ORAL
Refills: 0 | Status: DISCONTINUED | OUTPATIENT
Start: 2019-11-12 | End: 2019-11-14

## 2019-11-12 RX ORDER — CITRIC ACID/SODIUM CITRATE 300-500 MG
15 SOLUTION, ORAL ORAL EVERY 6 HOURS
Refills: 0 | Status: DISCONTINUED | OUTPATIENT
Start: 2019-11-12 | End: 2019-11-13

## 2019-11-12 RX ORDER — GLYCERIN ADULT
1 SUPPOSITORY, RECTAL RECTAL AT BEDTIME
Refills: 0 | Status: DISCONTINUED | OUTPATIENT
Start: 2019-11-12 | End: 2019-11-14

## 2019-11-12 RX ORDER — DIPHENHYDRAMINE HCL 50 MG
25 CAPSULE ORAL EVERY 6 HOURS
Refills: 0 | Status: DISCONTINUED | OUTPATIENT
Start: 2019-11-12 | End: 2019-11-14

## 2019-11-12 RX ORDER — SODIUM CHLORIDE 9 MG/ML
3 INJECTION INTRAMUSCULAR; INTRAVENOUS; SUBCUTANEOUS EVERY 8 HOURS
Refills: 0 | Status: DISCONTINUED | OUTPATIENT
Start: 2019-11-12 | End: 2019-11-14

## 2019-11-12 RX ORDER — OXYTOCIN 10 UNIT/ML
4 VIAL (ML) INJECTION
Qty: 30 | Refills: 0 | Status: DISCONTINUED | OUTPATIENT
Start: 2019-11-12 | End: 2019-11-13

## 2019-11-12 RX ORDER — AER TRAVELER 0.5 G/1
1 SOLUTION RECTAL; TOPICAL EVERY 4 HOURS
Refills: 0 | Status: DISCONTINUED | OUTPATIENT
Start: 2019-11-12 | End: 2019-11-14

## 2019-11-12 RX ORDER — OXYTOCIN 10 UNIT/ML
333.33 VIAL (ML) INJECTION
Qty: 20 | Refills: 0 | Status: DISCONTINUED | OUTPATIENT
Start: 2019-11-12 | End: 2019-11-14

## 2019-11-12 RX ORDER — KETOROLAC TROMETHAMINE 30 MG/ML
30 SYRINGE (ML) INJECTION ONCE
Refills: 0 | Status: DISCONTINUED | OUTPATIENT
Start: 2019-11-12 | End: 2019-11-14

## 2019-11-12 RX ORDER — PRAMOXINE HYDROCHLORIDE 150 MG/15G
1 AEROSOL, FOAM RECTAL EVERY 4 HOURS
Refills: 0 | Status: DISCONTINUED | OUTPATIENT
Start: 2019-11-12 | End: 2019-11-14

## 2019-11-12 RX ORDER — MAGNESIUM HYDROXIDE 400 MG/1
30 TABLET, CHEWABLE ORAL
Refills: 0 | Status: DISCONTINUED | OUTPATIENT
Start: 2019-11-12 | End: 2019-11-14

## 2019-11-12 RX ORDER — SODIUM CHLORIDE 9 MG/ML
1000 INJECTION, SOLUTION INTRAVENOUS
Refills: 0 | Status: DISCONTINUED | OUTPATIENT
Start: 2019-11-12 | End: 2019-11-13

## 2019-11-12 RX ORDER — DIBUCAINE 1 %
1 OINTMENT (GRAM) RECTAL EVERY 6 HOURS
Refills: 0 | Status: DISCONTINUED | OUTPATIENT
Start: 2019-11-12 | End: 2019-11-14

## 2019-11-12 RX ORDER — OXYCODONE HYDROCHLORIDE 5 MG/1
5 TABLET ORAL ONCE
Refills: 0 | Status: DISCONTINUED | OUTPATIENT
Start: 2019-11-12 | End: 2019-11-14

## 2019-11-12 RX ORDER — HYDROCORTISONE 1 %
1 OINTMENT (GRAM) TOPICAL EVERY 6 HOURS
Refills: 0 | Status: DISCONTINUED | OUTPATIENT
Start: 2019-11-12 | End: 2019-11-14

## 2019-11-12 RX ADMIN — SODIUM CHLORIDE 125 MILLILITER(S): 9 INJECTION, SOLUTION INTRAVENOUS at 21:13

## 2019-11-12 RX ADMIN — Medication 1000 MILLIUNIT(S)/MIN: at 23:35

## 2019-11-12 NOTE — PATIENT PROFILE OB - HOW PATIENT ADDRESSED, OB PROFILE
No other accepting beds, pt will have to return to [facility], met with pt & made him aware, he is agreeable to transfer. Received call back from [facility] stating they cannot receive pt until tomorrow, spoke with Dr. Maurisio Llamas & made him aware. Met with pt & made him aware. Available as needed.   Mari CheneyRN,ext [phone] Swati

## 2019-11-13 LAB
HCT VFR BLD CALC: 32.3 % — LOW (ref 34.5–45)
HGB BLD-MCNC: 10.7 G/DL — LOW (ref 11.5–15.5)
T PALLIDUM AB TITR SER: NEGATIVE — SIGNIFICANT CHANGE UP

## 2019-11-13 RX ORDER — IBUPROFEN 200 MG
600 TABLET ORAL EVERY 6 HOURS
Refills: 0 | Status: DISCONTINUED | OUTPATIENT
Start: 2019-11-13 | End: 2019-11-14

## 2019-11-13 RX ADMIN — Medication 975 MILLIGRAM(S): at 21:09

## 2019-11-13 RX ADMIN — Medication 600 MILLIGRAM(S): at 12:45

## 2019-11-13 RX ADMIN — Medication 975 MILLIGRAM(S): at 03:34

## 2019-11-13 RX ADMIN — Medication 975 MILLIGRAM(S): at 09:07

## 2019-11-13 RX ADMIN — Medication 975 MILLIGRAM(S): at 21:50

## 2019-11-13 RX ADMIN — Medication 1 TABLET(S): at 15:10

## 2019-11-13 RX ADMIN — Medication 975 MILLIGRAM(S): at 15:40

## 2019-11-13 RX ADMIN — Medication 600 MILLIGRAM(S): at 17:55

## 2019-11-13 RX ADMIN — Medication 975 MILLIGRAM(S): at 04:20

## 2019-11-13 RX ADMIN — Medication 600 MILLIGRAM(S): at 12:05

## 2019-11-13 RX ADMIN — Medication 600 MILLIGRAM(S): at 18:30

## 2019-11-13 RX ADMIN — SODIUM CHLORIDE 3 MILLILITER(S): 9 INJECTION INTRAMUSCULAR; INTRAVENOUS; SUBCUTANEOUS at 06:44

## 2019-11-13 RX ADMIN — Medication 975 MILLIGRAM(S): at 09:45

## 2019-11-13 RX ADMIN — Medication 600 MILLIGRAM(S): at 23:39

## 2019-11-13 RX ADMIN — Medication 975 MILLIGRAM(S): at 15:09

## 2019-11-13 RX ADMIN — Medication 600 MILLIGRAM(S): at 06:51

## 2019-11-13 NOTE — PROGRESS NOTE ADULT - ASSESSMENT
Per Dr. Stern-  Abdomen: Soft, nontender, non-distended, fundus firm.  Vaginal: Lochia WNL.   Ext: Neg edema, Neg calf tenderness

## 2019-11-13 NOTE — PROGRESS NOTE ADULT - PROBLEM SELECTOR PLAN 1
- Pain well controlled, continue current pain regimen  - Increase ambulation, SCDs when not ambulating  - Continue regular diet    Katie Montez, PGY-1

## 2019-11-13 NOTE — PROGRESS NOTE ADULT - SUBJECTIVE AND OBJECTIVE BOX
Postpartum Note- PPD#1      S:Patient w/o complaints, pain is controlled.    Pt is OOB, tolerating PO, voiding. Vaginal bleeding mild to moderate.       O:  Vital Signs Last 24 Hrs  T(C): 36.4 (13 Nov 2019 06:16), Max: 36.8 (12 Nov 2019 23:45)  T(F): 97.5 (13 Nov 2019 06:16), Max: 98.2 (12 Nov 2019 23:45)  HR: 80 (13 Nov 2019 06:16) (80 - 115)  BP: 104/68 (13 Nov 2019 06:16) (104/53 - 121/58)  BP(mean): 78 (13 Nov 2019 01:50) (73 - 83)  RR: 18 (13 Nov 2019 06:16) (16 - 18)  SpO2: 99% (13 Nov 2019 06:16) (98% - 100%)       LABS:    Hemoglobin: 10.7 g/dL (11-13 @ 06:25)  Hemoglobin: 11.1 g/dL (11-12 @ 21:48)      Hematocrit: 32.3 % (11-13 @ 06:25)  Hematocrit: 34.4 % (11-12 @ 21:48)

## 2019-11-13 NOTE — PROGRESS NOTE ADULT - SUBJECTIVE AND OBJECTIVE BOX
OB Progress Note:  PPD#1    S: 30yo PPD#1 s/p . Patient feels well. Pain is well controlled. She is tolerating a regular diet and passing flatus. She is voiding spontaneously, and ambulating without difficulty. Denies CP/SOB. Denies lightheadedness/dizziness. Denies N/V.    O:  Vitals:  Vital Signs Last 24 Hrs  T(C): 36.4 (2019 06:16), Max: 36.8 (2019 23:45)  T(F): 97.5 (2019 06:16), Max: 98.2 (2019 23:45)  HR: 80 (2019 06:16) (80 - 115)  BP: 104/68 (2019 06:16) (104/53 - 121/58)  BP(mean): 78 (2019 01:50) (73 - 83)  RR: 18 (2019 06:16) (16 - 18)  SpO2: 99% (2019 06:16) (98% - 100%)    MEDICATIONS  (STANDING):  acetaminophen   Tablet .. 975 milliGRAM(s) Oral <User Schedule>  diphtheria/tetanus/pertussis (acellular) Vaccine (ADAcel) 0.5 milliLiter(s) IntraMuscular once  ibuprofen  Tablet. 600 milliGRAM(s) Oral every 6 hours  ketorolac   Injectable 30 milliGRAM(s) IV Push once  oxytocin Infusion 333.333 milliUNIT(s)/Min (1000 mL/Hr) IV Continuous <Continuous>  oxytocin Infusion 333.333 milliUNIT(s)/Min (1000 mL/Hr) IV Continuous <Continuous>  prenatal multivitamin 1 Tablet(s) Oral daily  sodium chloride 0.9% lock flush 3 milliLiter(s) IV Push every 8 hours      Labs:  Blood type: O Positive  Rubella IgG: RPR: Negative                          10.7<L>   -- >-----------< --    (  @ 06:25 )             32.3<L>                        11.1<L>   14.28<H> >-----------< 206    (  @ 21:48 )             34.4<L>      Physical Exam:  General: NAD  Abdomen: soft, non-tender, non-distended, fundus firm  Vaginal: Lochia wnl  Extremities: No erythema/edema

## 2019-11-14 ENCOUNTER — TRANSCRIPTION ENCOUNTER (OUTPATIENT)
Age: 29
End: 2019-11-14

## 2019-11-14 VITALS
DIASTOLIC BLOOD PRESSURE: 64 MMHG | SYSTOLIC BLOOD PRESSURE: 102 MMHG | HEART RATE: 76 BPM | TEMPERATURE: 98 F | RESPIRATION RATE: 18 BRPM

## 2019-11-14 PROCEDURE — 85018 HEMOGLOBIN: CPT

## 2019-11-14 PROCEDURE — 59025 FETAL NON-STRESS TEST: CPT

## 2019-11-14 PROCEDURE — G0463: CPT

## 2019-11-14 PROCEDURE — 86850 RBC ANTIBODY SCREEN: CPT

## 2019-11-14 PROCEDURE — 85014 HEMATOCRIT: CPT

## 2019-11-14 PROCEDURE — 59050 FETAL MONITOR W/REPORT: CPT

## 2019-11-14 PROCEDURE — 85027 COMPLETE CBC AUTOMATED: CPT

## 2019-11-14 PROCEDURE — 86900 BLOOD TYPING SEROLOGIC ABO: CPT

## 2019-11-14 PROCEDURE — 86901 BLOOD TYPING SEROLOGIC RH(D): CPT

## 2019-11-14 PROCEDURE — 86780 TREPONEMA PALLIDUM: CPT

## 2019-11-14 RX ORDER — IBUPROFEN 200 MG
1 TABLET ORAL
Qty: 0 | Refills: 0 | DISCHARGE
Start: 2019-11-14

## 2019-11-14 RX ORDER — ACETAMINOPHEN 500 MG
3 TABLET ORAL
Qty: 0 | Refills: 0 | DISCHARGE
Start: 2019-11-14

## 2019-11-14 RX ADMIN — Medication 600 MILLIGRAM(S): at 11:48

## 2019-11-14 RX ADMIN — Medication 975 MILLIGRAM(S): at 08:43

## 2019-11-14 RX ADMIN — Medication 975 MILLIGRAM(S): at 09:15

## 2019-11-14 RX ADMIN — Medication 600 MILLIGRAM(S): at 00:10

## 2019-11-14 RX ADMIN — Medication 975 MILLIGRAM(S): at 03:15

## 2019-11-14 RX ADMIN — Medication 600 MILLIGRAM(S): at 12:20

## 2019-11-14 RX ADMIN — Medication 975 MILLIGRAM(S): at 02:37

## 2019-11-14 RX ADMIN — Medication 600 MILLIGRAM(S): at 06:11

## 2019-11-14 RX ADMIN — Medication 600 MILLIGRAM(S): at 05:31

## 2019-11-14 NOTE — DISCHARGE NOTE OB - CARE PLAN
Principal Discharge DX:	 (normal spontaneous vaginal delivery)  Goal:	recovery  Assessment and plan of treatment:	return to baseline health, regular diet, pain control, follow up with boo Stern

## 2019-11-14 NOTE — DISCHARGE NOTE OB - CARE PROVIDER_API CALL
Shauna Stern)  Obstetrics and Gynecology  [address], [address]  [place] [address]  Phone: [phone]  Fax: [phone]  Follow Up Time:

## 2019-11-14 NOTE — DISCHARGE NOTE OB - HOSPITAL COURSE
Conducted QoL assessment for EOT Epclusa + RBV.  Spoke to patient's wife, Cely.  She states Mr. Carlos rebolledo is fine after completing HCV treatment, and he did not report any symptoms of HCV in the past couple of months.  He is able to do regular daily activities. Counseled on importance of maintaining appointments and labwork.   Advised to call [facility] and provider if any issues arise.   
Pt status post . Uncomplicated recovery. Pt to follow up with Dr. Stern.

## 2019-11-14 NOTE — PROGRESS NOTE ADULT - SUBJECTIVE AND OBJECTIVE BOX
Postpartum Note- PPD#2      S:Patient w/o complaints, pain is controlled.    Pt is OOB, tolerating PO, voiding. Vaginal bleeding mild to moderate.       O:  Vital Signs Last 24 Hrs  T(C): 36.6 (14 Nov 2019 06:21), Max: 36.8 (13 Nov 2019 13:08)  T(F): 97.9 (14 Nov 2019 06:21), Max: 98.2 (13 Nov 2019 13:08)  HR: 76 (14 Nov 2019 06:21) (76 - 82)  BP: 102/64 (14 Nov 2019 06:21) (102/64 - 116/77)  BP(mean): --  RR: 18 (14 Nov 2019 06:21) (18 - 18)  SpO2: 99% (13 Nov 2019 13:08) (99% - 99%)       LABS:    Hemoglobin: 10.7 g/dL (11-13 @ 06:25)  Hemoglobin: 11.1 g/dL (11-12 @ 21:48)      Hematocrit: 32.3 % (11-13 @ 06:25)  Hematocrit: 34.4 % (11-12 @ 21:48)

## 2019-11-14 NOTE — DISCHARGE NOTE OB - PATIENT PORTAL LINK FT
You can access the FollowMyHealth Patient Portal offered by [facility] by registering at the following website: [email or web address]. By joining [facility]’s FollowMyHealth portal, you will also be able to view your health information using other applications (apps) compatible with our system.

## 2020-02-02 ENCOUNTER — TRANSCRIPTION ENCOUNTER (OUTPATIENT)
Age: 30
End: 2020-02-02

## 2020-04-26 ENCOUNTER — MESSAGE (OUTPATIENT)
Age: 30
End: 2020-04-26

## 2020-05-06 LAB
SARS-COV-2 IGG SERPL IA-ACNC: 4.7 INDEX
SARS-COV-2 IGG SERPL QL IA: POSITIVE

## 2021-04-05 NOTE — PATIENT PROFILE OB - BREAST MILK IS MORE DIGESTIBLE, MAKING VOMITING, DIARRHEA, GAS AND CONSTIPATION LESS COMMON
notified of pt elevated bp throughout recovery. MD wants bp rechecked at 0630. If systolic greater than or equal  and/ or diastolic greater than or equal to 110 md ordered 20mg of Labetalol be given IV push. Statement Selected

## 2021-06-29 ENCOUNTER — RESULT REVIEW (OUTPATIENT)
Age: 31
End: 2021-06-29

## 2022-07-11 ENCOUNTER — RESULT REVIEW (OUTPATIENT)
Age: 32
End: 2022-07-11

## 2022-07-26 NOTE — ED STATDOCS - CONDUCTED A DETAILED DISCUSSION WITH PATIENT AND/OR GUARDIAN REGARDING, MDM
0 need for outpatient follow-up/return to ED if symptoms worsen, persist or questions arise/radiology results/lab results

## 2022-08-17 NOTE — PATIENT PROFILE OB - ALCOHOL USE HISTORY SINGLE SELECT
Date/Time Patient Seen:  		  Referring MD:   Data Reviewed	       Patient is a 89y old  Male who presents with a chief complaint of cough -> PNA (16 Aug 2022 20:12)      Subjective/HPI     PAST MEDICAL & SURGICAL HISTORY:  No pertinent past medical history    Skin cancer  s/p Mohs    Essential hypertension    Cerebrovascular accident (CVA), unspecified mechanism  2015    Hyperlipidemia, unspecified hyperlipidemia type    Complex tear of lateral meniscus of right knee as current injury, initial encounter    Complex tear of medial meniscus of right knee as current injury, initial encounter    Benign prostatic hyperplasia, presence of lower urinary tract symptoms unspecified, unspecified morphology    H/O pilonidal cyst          Medication list         MEDICATIONS  (STANDING):  atorvastatin 40 milliGRAM(s) Oral at bedtime  finasteride 5 milliGRAM(s) Oral daily  guaifenesin/dextromethorphan Oral Liquid 10 milliLiter(s) Oral every 6 hours  levoFLOXacin IVPB 750 milliGRAM(s) IV Intermittent every 48 hours  metoprolol tartrate 100 milliGRAM(s) Oral two times a day  pantoprazole    Tablet 40 milliGRAM(s) Oral before breakfast  senna 2 Tablet(s) Oral at bedtime  tamsulosin 0.4 milliGRAM(s) Oral at bedtime    MEDICATIONS  (PRN):  acetaminophen     Tablet .. 650 milliGRAM(s) Oral every 6 hours PRN Temp greater or equal to 38C (100.4F), Mild Pain (1 - 3)  albuterol/ipratropium for Nebulization 3 milliLiter(s) Nebulizer every 6 hours PRN Shortness of Breath and/or Wheezing  aluminum hydroxide/magnesium hydroxide/simethicone Suspension 30 milliLiter(s) Oral every 4 hours PRN Dyspepsia  magnesium hydroxide Suspension 30 milliLiter(s) Oral daily PRN Constipation  melatonin 3 milliGRAM(s) Oral at bedtime PRN Insomnia  ondansetron Injectable 4 milliGRAM(s) IV Push every 8 hours PRN Nausea and/or Vomiting  polyethylene glycol 3350 17 Gram(s) Oral daily PRN Constipation         Vitals log        ICU Vital Signs Last 24 Hrs  T(C): 36.7 (17 Aug 2022 04:00), Max: 36.8 (16 Aug 2022 21:14)  T(F): 98 (17 Aug 2022 04:00), Max: 98.2 (16 Aug 2022 21:14)  HR: 76 (17 Aug 2022 04:00) (63 - 110)  BP: 143/90 (17 Aug 2022 04:00) (109/60 - 156/86)  BP(mean): 106 (17 Aug 2022 04:00) (70 - 106)  ABP: --  ABP(mean): --  RR: 33 (17 Aug 2022 04:00) (25 - 42)  SpO2: 89% (17 Aug 2022 04:00) (89% - 95%)    O2 Parameters below as of 17 Aug 2022 04:00  Patient On (Oxygen Delivery Method): nasal cannula, high flow  O2 Flow (L/min): 30  O2 Concentration (%): 50             Input and Output:  I&O's Detail    15 Aug 2022 07:01  -  16 Aug 2022 07:00  --------------------------------------------------------  IN:    Heparin Infusion: 360 mL    IV PiggyBack: 200 mL    Oral Fluid: 1080 mL  Total IN: 1640 mL    OUT:    Voided (mL): 1100 mL  Total OUT: 1100 mL    Total NET: 540 mL      16 Aug 2022 07:01  -  17 Aug 2022 06:01  --------------------------------------------------------  IN:    Heparin Infusion: 15 mL  Total IN: 15 mL    OUT:    Voided (mL): 500 mL  Total OUT: 500 mL    Total NET: -485 mL          Lab Data                        11.6   24.80 )-----------( 364      ( 16 Aug 2022 06:00 )             35.3     08-16    138  |  102  |  36<H>  ----------------------------<  115<H>  4.0   |  28  |  1.46<H>    Ca    7.7<L>      16 Aug 2022 06:00  Mg     2.0     08-16              Review of Systems	      Objective     Physical Examination    heart s1s2  lung dec BS  abd soft  head nc  on o2 support      Pertinent Lab findings & Imaging      Bud:  NO   Adequate UO     I&O's Detail    15 Aug 2022 07:01  -  16 Aug 2022 07:00  --------------------------------------------------------  IN:    Heparin Infusion: 360 mL    IV PiggyBack: 200 mL    Oral Fluid: 1080 mL  Total IN: 1640 mL    OUT:    Voided (mL): 1100 mL  Total OUT: 1100 mL    Total NET: 540 mL      16 Aug 2022 07:01  -  17 Aug 2022 06:01  --------------------------------------------------------  IN:    Heparin Infusion: 15 mL  Total IN: 15 mL    OUT:    Voided (mL): 500 mL  Total OUT: 500 mL    Total NET: -485 mL               Discussed with:     Cultures:	        Radiology                             never

## 2022-12-14 ENCOUNTER — NON-APPOINTMENT (OUTPATIENT)
Age: 32
End: 2022-12-14

## 2023-02-15 ENCOUNTER — NON-APPOINTMENT (OUTPATIENT)
Age: 33
End: 2023-02-15

## 2023-02-21 ENCOUNTER — RX RENEWAL (OUTPATIENT)
Age: 33
End: 2023-02-21

## 2023-02-25 NOTE — ED ADULT NURSE NOTE - OBJECTIVE STATEMENT
Progress Note - Pulmonary   Lidya Natarajan Hum 66 y o  female MRN: [id number]  Unit/Bed#: -01 Encounter: [id number]    Assessment:  Severe persistent asthma with acute exacerbation  Significant acid reflux  Obesity  Anxiety    Plan:  Severe persistent asthma with acute exacerbations with multiple exacerbations in the past likely secondary to her acid reflux and GERD symptoms  She is also following up with pulmonology in [place] where she has been getting the biologic agent   tezspire, in spite of being on this biologic agent she has been getting multiple hospital admissions with exacerbations, ?? Benefit of that medication  Seems all triggered by her acid reflux and GERD  I have increased her PPI to twice a day, she states she has been on Dexilant she will take that medication when she goes home  Also she will Pepcid twice a day she states she benefits from that 1 does not want Carafate because of nausea she states  We will continue with her nebulizer treatments around-the-clock along with the Symbicort and Flovent  Decrease the Solu-Medrol to 40 mg every 12 hours can decrease it to daily tomorrow and then taper it down  Speech and swallow evaluation  She does have an outpatient pulmonary follow-up with Dr Morton next week  Discussed with RAFA       Subjective:   Chest is still tight and I feel the acid coming down into my throat    Objective:     Vitals: Blood pressure 121/71, pulse 84, temperature 98 °F (36 7 °C), resp  rate 16, height 4' 11" (1 499 m), weight 84 2 kg (185 lb 10 oz), SpO2 94 %  ,Body mass index is 37 49 kg/m²        Intake/Output Summary (Last 24 hours) at 2/25/2023 1250  Last data filed at 2/24/2023 1402  Gross per 24 hour   Intake 50 ml   Output --   Net 50 ml       Invasive Devices     Peripheral Intravenous Line  Duration           Peripheral IV 02/24/23 Right;Dorsal (posterior) Hand 1 day                Physical Exam: Currently in bed in no acute respiratory distress  Eyes anicteric sclera, conjunctiva is clear  ENT nares is patent, no evidence of any discharge  Lungs diminished breath sounds bilaterally no rhonchi  Heart first and second heart sounds are heard no murmur or gallop is heard  Extremities no pedal edema  CNS awake alert and oriented x3    Labs:   CBC:   Lab Results   Component Value Date    WBC 13 82 (H) 02/25/2023    HGB 13 4 02/25/2023    HCT 40 3 02/25/2023    MCV 91 02/25/2023     (H) 02/25/2023    MCH 30 3 02/25/2023    MCHC 33 3 02/25/2023    RDW 14 3 02/25/2023    MPV 10 0 02/25/2023    NRBC 0 02/25/2023     Imaging and other studies: I have personally reviewed pertinent films in PACS 27 yo female presents to the ED from work c/o lightheadedness and dizziness tonight x2. 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 she states the episode passed and the same episode happened while giving medication to a patient at 0415 today. she states she felt tachycardic of 115 and a BP of 90s systolically and went to ED triage area. patient is AAOx4. lung sounds clear, cap refill <3sec. patient is 25 weeks pregnant with first pregnancy. patient denies feeling dizzy or lightheaded currently. she denies fevers, chills, N/V/D, HA, cough, abdominal pain, vaginal bleeding or d/c, urinary issues. patient -107. MD at the bedside.

## 2023-08-31 ENCOUNTER — APPOINTMENT (OUTPATIENT)
Dept: OBGYN | Facility: CLINIC | Age: 33
End: 2023-08-31
Payer: COMMERCIAL

## 2023-08-31 VITALS — WEIGHT: 153 LBS | DIASTOLIC BLOOD PRESSURE: 70 MMHG | SYSTOLIC BLOOD PRESSURE: 100 MMHG

## 2023-08-31 DIAGNOSIS — R87.810 ATYPICAL SQUAMOUS CELLS OF UNDETERMINED SIGNIFICANCE ON CYTOLOGIC SMEAR OF CERVIX (ASC-US): ICD-10-CM

## 2023-08-31 DIAGNOSIS — R87.610 ATYPICAL SQUAMOUS CELLS OF UNDETERMINED SIGNIFICANCE ON CYTOLOGIC SMEAR OF CERVIX (ASC-US): ICD-10-CM

## 2023-08-31 PROCEDURE — 57454 BX/CURETT OF CERVIX W/SCOPE: CPT

## 2023-08-31 PROCEDURE — 99204 OFFICE O/P NEW MOD 45 MIN: CPT | Mod: 25

## 2023-08-31 RX ORDER — DROSPIRENONE AND ETHINYL ESTRADIOL 0.02-3(28)
3-0.02 KIT ORAL
Refills: 0 | Status: ACTIVE | COMMUNITY

## 2023-08-31 NOTE — HISTORY OF PRESENT ILLNESS
[FreeTextEntry1] : The patient is 32 years old  2 para 2-0-0-2 whose last menstrual period was 2023.  She is referred for a Pap test showing HPV positive ASCUS.  This is her first abnormal Pap test. ***The patient was asked about all of the following, positive responses are listed below*** Gynecologic History: History of breast biopsy or breast cyst aspiration; Pain during or after intercourse; Vaginal bleeding or spotting between periods; Breast discharge; Abnormal or irregular periods; Abnormal vaginal discharge; history of Gynecologic cancer; history of Ovarian cysts; history of Fibroids; history of frequent Urinary tract infections; Urinary problems (i.e. frequency, urgency, difficulty, leaking); history of Pelvic pain/pelvic inflammatory disease; history of chronic Vaginal infections (i.e., yeast, Trichomonas, bacterial Vaginosis) Contraceptive History: What she is currently using for birth control; If she was requesting birth control today. Sexually Transmitted Disease History: A history of any of the following sexually transmitted diseases: Gonorrhea; Chlamydia; Syphilis; Herpes; HPV/Warts; Hepatitis; HIV/AIDS and whether she feels that she is at risk for HIV/AIDS and wants to be tested for HIV. Abnormal Pap Smear History: History of abnormal Pap smear; evaluation of any abnormalities; treatment of any abnormalities Date and result of her last Pap smear. SANJEEV in Utero Exposure History: A history of personal or maternal SANJEEV exposure. Hospitalizations (other than childbirth) Blood transfusions. Review of Systems: General: weight change, general health; Head: headaches, vertigo; Eyes: vision, diplopia; Ears: change in hearing, tinnitus; Nose: epistaxis, chronic rhinitis; Mouth: gingival bleeding; Neck: stiffness, pain, masses; Chest: dyspnea, wheezing, hemoptysis, persistent cough; Heart: chest pains, palpitations; Abdomen: liver disease, abdominal discomfort, Indigestion, Nausea/Vomiting, Constipation/Diarrhea, Blood in stool, change in bowel habits; : renal disease; Musculoskeletal: pain in muscles or joints, paresthesias or numbness; Skin: rashes, itching, pigmentation changes; Neurologic: weakness, tremor, seizures, changes in mentation; Psychiatric: depressive symptoms, changes in sleep habits. Surgery: History of gynecological surgery; History of non-gynecological surgery.   ***Pregnancy History*** # Pregnancies 2; # deliveries 2; # vaginal 2.   ***Menstrual History*** Age of first period: 15; # of days between cycles: irregular; duration of period: Irregular days; she has cramps with periods; uses occasionally Tylenol to relieve cramps.   ***Sexual history*** She is sexually active; [place]: 17; Total # of lifetime partners:5; She denies having more than one current partner; She has been with her current partner for 1.5 years; Medical history - Alcohol abuse; Anemia; Anxiety; Arthritis/Lupus; Asthma; Blood clots in legs; Blood disorders; Breast problems; Cancer; Colitis; Depression; Diabetes; Drug Abuse; Eating disorder; Elevated cholesterol; Epilepsy/seizures; Eye problems/glaucoma; Gall bladder disease; Gout; Head or neck radiation; Hearing problems; Heart Disease; Hemorrhoid; Hepatitis or jaundice; High blood pressure; HIV/AIDS; Kidney disease; Low back problems; Neurological disorders; Osteoporosis; Other; Pneumonia; Rheumatic fever; Skin diseases; Stroke; Thyroid disease; Ulcers Family History - Cancers of the Breast; Cervix; Uterus; Ovarian; Lung; Colon; Other Vaccination Status - Whether she had all the usual childhood vaccinations or illness (negative answers recorded); Whether she was not vaccinated for HPV (All 3 doses) Social History - Current or past cigarette smoking; current or past alcohol abuse; current or past Marijuana use; current or past Cocaine use; current or past abuse of any other illegal or prescription drugs; current employment.   ***Preventative Care*** Date of the patient's last: Pap smear: 2023 Breast exam: 2023   ***Social History*** The patient was asked about all of the following; positive responses are listed below: current or past cigarette smoking; Alcohol; current or past Marijuana use; current or past Cocaine use; current or past abuse of any other illegal or prescription drugs.  Employment:  Registered nurse   ***Significant positives*** Medications that she was taking at the time of the visit include Vestura. She was not taking any other medication at the time of the visit. She denied any medication allergies at the time of the visit. Her past gynecological history is significant for abnormal pap test and hospitalization following vaginal delivery. Her past gynecological history is not otherwise significant. She is sexually active and uses oral contraceptives for birth control. She has a history of ASCUS HPV+. Her last Pap test was 2023 and was abnormal. Her past medical history is significant for gallstones (planned laparoscopic cholecystectomy 23). Her past surgical history is not significant. Her past medical and surgical histories are not otherwise significant. Besides hospitalizations for deliveries, she has been hospitalized following her vaginal birth. She was not   vaccinated for HPV. A 20-point review of systems was not significant. Her review of systems was not otherwise significant. Her family's cancer history is significant for mother with metastatic throat cancer. Her family's cancer history is not otherwise significant. She does not smoke. She occasionally consumes alcohol.

## 2023-08-31 NOTE — PHYSICAL EXAM
[Chaperone Present] : A chaperone was present in the examining room during all aspects of the physical examination [TextEntry] : ***General*** General Appearance: normal; Judgment and insight: normal; the patient is oriented to time, place and person; Recent and remote memory: normal; Mood and affect: normal; Respiratory effort: normal.   ***Pelvic Examination*** External genitalia: the appearance and hair distribution are normal; no lesions are appreciated. Urethra/urethral meatus: no masses or tenderness are appreciated; there is no scarring noted. Bladder: nontender; there is no fullness appreciated; no masses were palpated. Vagina: the vagina is ~  ~ ; no abnormal discharge is seen; no lesions are seen; pelvic support is adequate without any evidence of cystocele or rectocele. Cervix: normal in appearance; no overt lesions are seen. Uterus: ~  ~ ; normal in size, mobile, nontender, and well supported. Adnexa/parametria: nontender and not enlarged; no masses are palpated. A stool specimen was not indicated at this time.  ***colposcopy of the cervix*** On colposcopy of the cervix, the transformation zone was not fully visualized.  Acetowhite epithelium was seen extending into the endocervical canal.  Biopsies were taken at 12:00 and 7:00.  An ECC was done.

## 2023-08-31 NOTE — PLAN
[FreeTextEntry1] : I recommended that she call for the results of her cervical biopsies and ECC in 1 week.  Management will be based on the results.

## 2023-08-31 NOTE — ASSESSMENT
[TextEntry] : The patient has new onset HPV positive ASCUS.  This is her first abnormal Pap.  Colposcopy, a cervical biopsy and ECC were done.  Acetowhite epithelium extended into the endocervical canal. 46 minutes of total time was spent on the date of the encounter. This included time for review of medical records and laboratory results, face to face time (including the physical examination counseling and coordination of care), time for patient education, treatment plans, answering questions, communicating with other doctors and for medical record documentation.  The time spent is separate from time spent on separately billed procedures.

## 2023-09-05 ENCOUNTER — OUTPATIENT (OUTPATIENT)
Dept: OUTPATIENT SERVICES | Facility: HOSPITAL | Age: 33
LOS: 1 days | End: 2023-09-05

## 2023-09-05 VITALS
TEMPERATURE: 98 F | HEIGHT: 65 IN | WEIGHT: 154.98 LBS | RESPIRATION RATE: 14 BRPM | DIASTOLIC BLOOD PRESSURE: 76 MMHG | HEART RATE: 92 BPM | OXYGEN SATURATION: 99 % | SYSTOLIC BLOOD PRESSURE: 115 MMHG

## 2023-09-05 DIAGNOSIS — K80.20 CALCULUS OF GALLBLADDER WITHOUT CHOLECYSTITIS WITHOUT OBSTRUCTION: ICD-10-CM

## 2023-09-05 LAB
ALBUMIN SERPL ELPH-MCNC: 4.1 G/DL — SIGNIFICANT CHANGE UP (ref 3.3–5)
ALP SERPL-CCNC: 45 U/L — SIGNIFICANT CHANGE UP (ref 40–120)
ALT FLD-CCNC: 12 U/L — SIGNIFICANT CHANGE UP (ref 4–33)
ANION GAP SERPL CALC-SCNC: 9 MMOL/L — SIGNIFICANT CHANGE UP (ref 7–14)
AST SERPL-CCNC: 16 U/L — SIGNIFICANT CHANGE UP (ref 4–32)
BILIRUB SERPL-MCNC: <0.2 MG/DL — SIGNIFICANT CHANGE UP (ref 0.2–1.2)
BUN SERPL-MCNC: 12 MG/DL — SIGNIFICANT CHANGE UP (ref 7–23)
CALCIUM SERPL-MCNC: 9.1 MG/DL — SIGNIFICANT CHANGE UP (ref 8.4–10.5)
CHLORIDE SERPL-SCNC: 105 MMOL/L — SIGNIFICANT CHANGE UP (ref 98–107)
CO2 SERPL-SCNC: 26 MMOL/L — SIGNIFICANT CHANGE UP (ref 22–31)
CREAT SERPL-MCNC: 0.72 MG/DL — SIGNIFICANT CHANGE UP (ref 0.5–1.3)
EGFR: 115 ML/MIN/1.73M2 — SIGNIFICANT CHANGE UP
GLUCOSE SERPL-MCNC: 76 MG/DL — SIGNIFICANT CHANGE UP (ref 70–99)
HCG UR QL: NEGATIVE — SIGNIFICANT CHANGE UP
HCT VFR BLD CALC: 35.3 % — SIGNIFICANT CHANGE UP (ref 34.5–45)
HGB BLD-MCNC: 11.1 G/DL — LOW (ref 11.5–15.5)
MCHC RBC-ENTMCNC: 27.8 PG — SIGNIFICANT CHANGE UP (ref 27–34)
MCHC RBC-ENTMCNC: 31.4 GM/DL — LOW (ref 32–36)
MCV RBC AUTO: 88.3 FL — SIGNIFICANT CHANGE UP (ref 80–100)
NRBC # BLD: 0 /100 WBCS — SIGNIFICANT CHANGE UP (ref 0–0)
NRBC # FLD: 0 K/UL — SIGNIFICANT CHANGE UP (ref 0–0)
PLATELET # BLD AUTO: 276 K/UL — SIGNIFICANT CHANGE UP (ref 150–400)
POTASSIUM SERPL-MCNC: 4 MMOL/L — SIGNIFICANT CHANGE UP (ref 3.5–5.3)
POTASSIUM SERPL-SCNC: 4 MMOL/L — SIGNIFICANT CHANGE UP (ref 3.5–5.3)
PROT SERPL-MCNC: 7.2 G/DL — SIGNIFICANT CHANGE UP (ref 6–8.3)
RBC # BLD: 4 M/UL — SIGNIFICANT CHANGE UP (ref 3.8–5.2)
RBC # FLD: 12.9 % — SIGNIFICANT CHANGE UP (ref 10.3–14.5)
SODIUM SERPL-SCNC: 140 MMOL/L — SIGNIFICANT CHANGE UP (ref 135–145)
WBC # BLD: 6.05 K/UL — SIGNIFICANT CHANGE UP (ref 3.8–10.5)
WBC # FLD AUTO: 6.05 K/UL — SIGNIFICANT CHANGE UP (ref 3.8–10.5)

## 2023-09-05 RX ORDER — SODIUM CHLORIDE 9 MG/ML
1000 INJECTION, SOLUTION INTRAVENOUS
Refills: 0 | Status: DISCONTINUED | OUTPATIENT
Start: 2023-09-18 | End: 2023-10-02

## 2023-09-05 NOTE — H&P PST ADULT - HISTORY OF PRESENT ILLNESS
33y/o female scheduled for laparoscopic cholecystectomy on 9/18/2023.  Pt states, "mid upper quad pain radiating to right and left side, wores after eating,  has been occurring more frequent, US shows gall stones.

## 2023-09-05 NOTE — H&P PST ADULT - PROBLEM SELECTOR PLAN 1
Pt scheduled for laparoscopic cholecystectomy on 9/18/2023.  labs done results pending,  Urine cup provided.  Chlorhexidine provided-  written and verbal instructions given, pt able to verbalize understanding   Preop teaching done, pt able to verbalize understanding.   medication day of procedure-  vestura, pepcid

## 2023-09-05 NOTE — H&P PST ADULT - GASTROINTESTINAL
normal/soft/nontender/nondistended/normal active bowel sounds/no guarding/no rigidity/no organomegaly/no palpable albret/no masses palpable details…

## 2023-09-11 ENCOUNTER — TRANSCRIPTION ENCOUNTER (OUTPATIENT)
Age: 33
End: 2023-09-11

## 2023-09-14 ENCOUNTER — NON-APPOINTMENT (OUTPATIENT)
Age: 33
End: 2023-09-14

## 2023-09-15 LAB — CORE LAB BIOPSY: NORMAL

## 2023-09-15 NOTE — ASU PATIENT PROFILE, ADULT - FALL HARM RISK - FACTORS NURSING JUDGEMENT
The patient came to his appointment today to learn how to use the [employer] Personal CGM. He had his sensor with him. He was instructed on how to apply the sensor and use the sensor reader. I printed the entire guide and we reviewed it together. The patient did not have any questions or concerns at this time. He is to call me with any problems. He was instructed to contact Aliza MORGAN to see if he should come in before March now that he has the CGMS.      Milli Washington RN/CDE     No

## 2023-09-17 ENCOUNTER — TRANSCRIPTION ENCOUNTER (OUTPATIENT)
Age: 33
End: 2023-09-17

## 2023-09-18 ENCOUNTER — OUTPATIENT (OUTPATIENT)
Dept: OUTPATIENT SERVICES | Facility: HOSPITAL | Age: 33
LOS: 1 days | Discharge: HOME | End: 2023-09-18
Payer: COMMERCIAL

## 2023-09-18 ENCOUNTER — TRANSCRIPTION ENCOUNTER (OUTPATIENT)
Age: 33
End: 2023-09-18

## 2023-09-18 VITALS
TEMPERATURE: 99 F | HEART RATE: 90 BPM | HEIGHT: 65 IN | OXYGEN SATURATION: 100 % | WEIGHT: 154.98 LBS | RESPIRATION RATE: 16 BRPM | DIASTOLIC BLOOD PRESSURE: 66 MMHG | SYSTOLIC BLOOD PRESSURE: 115 MMHG

## 2023-09-18 VITALS
DIASTOLIC BLOOD PRESSURE: 61 MMHG | HEART RATE: 86 BPM | RESPIRATION RATE: 14 BRPM | OXYGEN SATURATION: 100 % | SYSTOLIC BLOOD PRESSURE: 112 MMHG

## 2023-09-18 DIAGNOSIS — K80.20 CALCULUS OF GALLBLADDER WITHOUT CHOLECYSTITIS WITHOUT OBSTRUCTION: ICD-10-CM

## 2023-09-18 LAB — HCG UR QL: NEGATIVE — SIGNIFICANT CHANGE UP

## 2023-09-18 PROCEDURE — 88304 TISSUE EXAM BY PATHOLOGIST: CPT | Mod: 26

## 2023-09-18 DEVICE — CLIP APPLIER COVIDIEN ENDOCLIP II 10MM MED/LG: Type: IMPLANTABLE DEVICE | Status: FUNCTIONAL

## 2023-09-18 RX ORDER — SODIUM CHLORIDE 9 MG/ML
500 INJECTION, SOLUTION INTRAVENOUS ONCE
Refills: 0 | Status: COMPLETED | OUTPATIENT
Start: 2023-09-18 | End: 2023-09-18

## 2023-09-18 RX ORDER — OXYCODONE HYDROCHLORIDE 5 MG/1
1 TABLET ORAL
Qty: 15 | Refills: 0
Start: 2023-09-18

## 2023-09-18 RX ORDER — SODIUM CHLORIDE 9 MG/ML
1000 INJECTION, SOLUTION INTRAVENOUS
Refills: 0 | Status: DISCONTINUED | OUTPATIENT
Start: 2023-09-18 | End: 2023-10-02

## 2023-09-18 RX ORDER — SIMETHICONE 80 MG/1
80 TABLET, CHEWABLE ORAL ONCE
Refills: 0 | Status: COMPLETED | OUTPATIENT
Start: 2023-09-18 | End: 2023-09-18

## 2023-09-18 RX ORDER — OXYCODONE HYDROCHLORIDE 5 MG/1
5 TABLET ORAL ONCE
Refills: 0 | Status: DISCONTINUED | OUTPATIENT
Start: 2023-09-18 | End: 2023-09-18

## 2023-09-18 RX ORDER — FENTANYL CITRATE 50 UG/ML
50 INJECTION INTRAVENOUS
Refills: 0 | Status: DISCONTINUED | OUTPATIENT
Start: 2023-09-18 | End: 2023-09-18

## 2023-09-18 RX ORDER — HYDROMORPHONE HYDROCHLORIDE 2 MG/ML
1 INJECTION INTRAMUSCULAR; INTRAVENOUS; SUBCUTANEOUS
Refills: 0 | Status: DISCONTINUED | OUTPATIENT
Start: 2023-09-18 | End: 2023-09-18

## 2023-09-18 RX ORDER — KETOROLAC TROMETHAMINE 30 MG/ML
30 SYRINGE (ML) INJECTION ONCE
Refills: 0 | Status: DISCONTINUED | OUTPATIENT
Start: 2023-09-18 | End: 2023-09-18

## 2023-09-18 RX ORDER — DROSPIRENONE AND ETHINYL ESTRADIOL 0.03MG-3MG
1 KIT ORAL
Refills: 0 | DISCHARGE

## 2023-09-18 RX ORDER — ONDANSETRON 8 MG/1
4 TABLET, FILM COATED ORAL ONCE
Refills: 0 | Status: COMPLETED | OUTPATIENT
Start: 2023-09-18 | End: 2023-09-18

## 2023-09-18 RX ADMIN — FENTANYL CITRATE 50 MICROGRAM(S): 50 INJECTION INTRAVENOUS at 11:46

## 2023-09-18 RX ADMIN — Medication 30 MILLIGRAM(S): at 10:35

## 2023-09-18 RX ADMIN — SIMETHICONE 80 MILLIGRAM(S): 80 TABLET, CHEWABLE ORAL at 10:56

## 2023-09-18 RX ADMIN — FENTANYL CITRATE 50 MICROGRAM(S): 50 INJECTION INTRAVENOUS at 11:07

## 2023-09-18 RX ADMIN — Medication 30 MILLIGRAM(S): at 11:01

## 2023-09-18 RX ADMIN — SODIUM CHLORIDE 75 MILLILITER(S): 9 INJECTION, SOLUTION INTRAVENOUS at 09:38

## 2023-09-18 RX ADMIN — OXYCODONE HYDROCHLORIDE 5 MILLIGRAM(S): 5 TABLET ORAL at 13:30

## 2023-09-18 RX ADMIN — ONDANSETRON 4 MILLIGRAM(S): 8 TABLET, FILM COATED ORAL at 09:40

## 2023-09-18 RX ADMIN — SODIUM CHLORIDE 1000 MILLILITER(S): 9 INJECTION, SOLUTION INTRAVENOUS at 10:45

## 2023-09-18 RX ADMIN — OXYCODONE HYDROCHLORIDE 5 MILLIGRAM(S): 5 TABLET ORAL at 12:43

## 2023-09-18 RX ADMIN — HYDROMORPHONE HYDROCHLORIDE 1 MILLIGRAM(S): 2 INJECTION INTRAMUSCULAR; INTRAVENOUS; SUBCUTANEOUS at 09:38

## 2023-09-18 NOTE — ASU DISCHARGE PLAN (ADULT/PEDIATRIC) - CARE PROVIDER_API CALL
Jeri Johnson  Colon/Rectal Surgery  [address] [facility] [address], [address]  [place] [address]  Phone: [phone]  Fax: [phone]  Follow Up Time:

## 2023-09-18 NOTE — BRIEF OPERATIVE NOTE - NSICDXBRIEFPOSTOP_GEN_ALL_CORE_FT
Diet For Vomiting or Diarrhea (Age 6 to Adult]    If your symptoms return or get worse after eating certain foods listed below, you should stop eating them until your symptoms discontinue and you feel better.  Once the vomiting stops, then...   During the first 12 to 24 hours  During the first 12 to 24 hours, follow the diet below:  · Beverages. Plain water, sport drinks like Gatorade, soft drinks without caffeine; mineral water (plain or flavored); clear fruit juices, decaffeinated tea and coffee.  · Soups. Clear broth, consommé, and bouillon  · Desserts. Plain gelatin, popsicles and fruit juice bars. As you feel better, you may add 6 oz to 8 oz of yogurt per day. If you are suffering from diarrhea, avoid foods and beverages that contain sugar, high-fructose corn syrup, and sugar alcohols.  During the next 24 hours  During the next 24 hours you may add the following to the above:  · Hot cereal, plain toast, bread, rolls, crackers  · Plain noodles, rice, mashed potatoes, chicken noodle or rice soup  · Unsweetened canned fruit (avoid pineapple), bananas  Limit fat intake to less than 15 grams per day by avoiding margarine, butter, oils, mayonnaise, sauces, gravies, fried foods, peanut butter, meat, poultry, and fish.  Limit fiber; avoid raw or cooked vegetables, fresh fruits (except bananas), and bran cereals.  Limit caffeine and chocolate. No spices or seasonings except salt.  During the next 24 hours  Gradually resume a normal diet, as you feel better and your symptoms lessen.  © 2628-3892 [employer]. [address] [place] [address], [place] [address]. All rights reserved. This information is not intended as a substitute for professional medical care. Always follow your healthcare professional's instructions.         POST-OP DIAGNOSIS:  Acute cholecystitis 18-Sep-2023 09:26:55  Yomaira Mosley

## 2023-09-18 NOTE — ASU DISCHARGE PLAN (ADULT/PEDIATRIC) - NS MD DC FALL RISK RISK
For information on Fall & Injury Prevention, visit: [email or web address] OR  [email or web address] OR  https://www.cdc.gov/steadi/patient.html

## 2023-09-18 NOTE — ASU DISCHARGE PLAN (ADULT/PEDIATRIC) - ASU DC SPECIAL INSTRUCTIONSFT
BATHING: Please do not submerge wound underwater. You may shower and/or sponge bathe.     ACTIVITY: No heavy lifting or straining. Otherwise, you may return to your usual level of physical activity. If you are taking narcotic pain medication (such as Percocet)   DO NOT drive a car, operate machinery or make important decisions.    DIET: Return to your usual diet.    NOTIFY YOUR SURGEON IF: You have any bleeding that does not stop, any pus draining from your wound(s), any fever (over 100.4 F) or chills, persistent nausea/vomiting, persistent diarrhea, or if your pain is not controlled on your discharge pain medications.    PAIN: You may take tylenol for pain. For severe pain, oxycodone has been prescribed to your pharmacy on file.    FOLLOW-UP: Please follow up with Dr. Johnson in 7-10 days after discharge. Please call the number provided in order to set up appointment. BATHING: Please do not submerge wound underwater. You may shower and/or sponge bathe.     ACTIVITY: No heavy lifting or straining. Otherwise, you may return to your usual level of physical activity. If you are taking narcotic pain medication (such as Percocet)   DO NOT drive a car, operate machinery or make important decisions.    DIET: Return to your usual diet.    NOTIFY YOUR SURGEON IF: You have any bleeding that does not stop, any pus draining from your wound(s), any fever (over 100.4 F) or chills, persistent nausea/vomiting, persistent diarrhea, or if your pain is not controlled on your discharge pain medications.    PAIN: You may take tylenol for pain. For severe pain, oxycodone has been prescribed to your pharmacy on file.    FOLLOW-UP: Please follow up with Dr. Johnson in 2 weeks after discharge. Please call the number provided in order to set up appointment.

## 2023-09-18 NOTE — ASU DISCHARGE PLAN (ADULT/PEDIATRIC) - CALL YOUR DOCTOR IF YOU HAVE ANY OF THE FOLLOWING:
Bleeding that does not stop/Swelling that gets worse/Fever greater than (need to indicate Fahrenheit or Celsius) Bleeding that does not stop/Swelling that gets worse/Pain not relieved by Medications/Fever greater than (need to indicate Fahrenheit or Celsius)/Wound/Surgical Site with redness, or foul smelling discharge or pus/Nausea and vomiting that does not stop/Unable to urinate

## 2023-10-02 LAB
SURGICAL PATHOLOGY STUDY: SIGNIFICANT CHANGE UP

## 2023-11-15 ENCOUNTER — TRANSCRIPTION ENCOUNTER (OUTPATIENT)
Age: 33
End: 2023-11-15

## 2023-11-30 ENCOUNTER — TRANSCRIPTION ENCOUNTER (OUTPATIENT)
Age: 33
End: 2023-11-30

## 2023-12-04 ENCOUNTER — TRANSCRIPTION ENCOUNTER (OUTPATIENT)
Age: 33
End: 2023-12-04

## 2023-12-13 ENCOUNTER — TRANSCRIPTION ENCOUNTER (OUTPATIENT)
Age: 33
End: 2023-12-13

## 2023-12-13 PROBLEM — E28.2 POLYCYSTIC OVARIAN SYNDROME: Chronic | Status: ACTIVE | Noted: 2023-09-15

## 2023-12-13 PROBLEM — K80.20 CALCULUS OF GALLBLADDER WITHOUT CHOLECYSTITIS WITHOUT OBSTRUCTION: Chronic | Status: ACTIVE | Noted: 2023-09-05

## 2023-12-15 NOTE — H&P PST ADULT - NSANTHGENDERRD_ENT_A_CORE
SUBJECTIVE:  No acute complaints, NPO.   HDS stable, afebrile.    MEDICATIONS  (STANDING):  acetylcysteine 20%  Inhalation 4 milliLiter(s) Inhalation every 6 hours  albuterol/ipratropium for Nebulization 3 milliLiter(s) Nebulizer every 6 hours  aspirin Suppository 300 milliGRAM(s) Rectal daily  chlorhexidine 0.12% Liquid 15 milliLiter(s) Oral Mucosa two times a day  chlorhexidine 2% Cloths 1 Application(s) Topical daily  dextrose 5% + sodium chloride 0.45% 1000 milliLiter(s) (100 mL/Hr) IV Continuous <Continuous>  dextrose 5%. 1000 milliLiter(s) (50 mL/Hr) IV Continuous <Continuous>  dextrose 5%. 1000 milliLiter(s) (100 mL/Hr) IV Continuous <Continuous>  dextrose 50% Injectable 12.5 Gram(s) IV Push once  dextrose 50% Injectable 25 Gram(s) IV Push once  dextrose 50% Injectable 25 Gram(s) IV Push once  enoxaparin Injectable 40 milliGRAM(s) SubCutaneous every 24 hours  glucagon  Injectable 1 milliGRAM(s) IntraMuscular once  influenza  Vaccine (HIGH DOSE) 0.7 milliLiter(s) IntraMuscular once  pantoprazole  Injectable 40 milliGRAM(s) IV Push daily  piperacillin/tazobactam IVPB.. 4.5 Gram(s) IV Intermittent every 8 hours  sodium chloride 0.9% for Nebulization 3 milliLiter(s) Nebulizer every 6 hours  sodium chloride 3%  Inhalation 4 milliLiter(s) Inhalation every 6 hours    MEDICATIONS  (PRN):  acetaminophen   IVPB .. 1000 milliGRAM(s) IV Intermittent once PRN Temp greater or equal to 38C (100.4F), Mild Pain (1 - 3)  dextrose Oral Gel 15 Gram(s) Oral once PRN Blood Glucose LESS THAN 70 milliGRAM(s)/deciliter  HYDROmorphone  Injectable 0.5 milliGRAM(s) IV Push every 4 hours PRN Breakthrough  ondansetron Injectable 4 milliGRAM(s) IV Push every 6 hours PRN Nausea and/or Vomiting      Vital Signs Last 24 Hrs  T(C): 36.9 (15 Dec 2023 09:10), Max: 37.3 (14 Dec 2023 18:11)  T(F): 98.4 (15 Dec 2023 09:10), Max: 99.1 (14 Dec 2023 18:11)  HR: 62 (15 Dec 2023 11:35) (46 - 66)  BP: 121/60 (15 Dec 2023 11:35) (121/60 - 137/62)  BP(mean): 86 (15 Dec 2023 11:35) (86 - 93)  RR: 18 (15 Dec 2023 11:35) (16 - 18)  SpO2: 99% (15 Dec 2023 11:35) (98% - 100%)    Parameters below as of 15 Dec 2023 11:35  Patient On (Oxygen Delivery Method): tracheostomy collar  O2 Flow (L/min): 10  O2 Concentration (%): 40    I&O's Summary    14 Dec 2023 07:01  -  15 Dec 2023 07:00  --------------------------------------------------------  IN: 2300 mL / OUT: 1000 mL / NET: 1300 mL    15 Dec 2023 07:01  -  15 Dec 2023 12:20  --------------------------------------------------------  IN: 0 mL / OUT: 0 mL / NET: 0 mL        LABS:                        8.4    6.00  )-----------( 219      ( 15 Dec 2023 06:02 )             25.7     12-15    137  |  102  |  10  ----------------------------<  133<H>  3.6   |  26  |  0.68    Ca    8.5      15 Dec 2023 06:02  Phos  3.7     12-15  Mg     1.8     12-15      PT/INR - ( 15 Dec 2023 06:02 )   PT: 12.5 sec;   INR: 1.10          PTT - ( 15 Dec 2023 06:02 )  PTT:27.7 sec  Urinalysis Basic - ( 15 Dec 2023 06:02 )    Color: x / Appearance: x / SG: x / pH: x  Gluc: 133 mg/dL / Ketone: x  / Bili: x / Urobili: x   Blood: x / Protein: x / Nitrite: x   Leuk Esterase: x / RBC: x / WBC x   Sq Epi: x / Non Sq Epi: x / Bacteria: x      CAPILLARY BLOOD GLUCOSE            RADIOLOGY & ADDITIONAL STUDIES:   No

## 2024-01-26 NOTE — PACU DISCHARGE NOTE - MENTAL STATUS: PATIENT PARTICIPATION
Pt informed of message below and verbalized understanding with no questions.     Pt offered an appt on 01/26/2024 at 1425 and 01/29/2024 at 1125.    Pt will need to call back to see what time works better for him.   Awake

## 2024-02-12 ENCOUNTER — NON-APPOINTMENT (OUTPATIENT)
Age: 34
End: 2024-02-12

## 2024-02-21 ENCOUNTER — NON-APPOINTMENT (OUTPATIENT)
Age: 34
End: 2024-02-21

## 2024-03-06 ENCOUNTER — APPOINTMENT (OUTPATIENT)
Dept: BARIATRICS/WEIGHT MGMT | Facility: CLINIC | Age: 34
End: 2024-03-06

## 2024-07-10 ENCOUNTER — NON-APPOINTMENT (OUTPATIENT)
Age: 34
End: 2024-07-10

## 2024-07-10 ENCOUNTER — APPOINTMENT (OUTPATIENT)
Dept: INTERNAL MEDICINE | Facility: CLINIC | Age: 34
End: 2024-07-10

## 2024-07-10 VITALS
TEMPERATURE: 98.1 F | DIASTOLIC BLOOD PRESSURE: 70 MMHG | HEIGHT: 62 IN | BODY MASS INDEX: 30.93 KG/M2 | WEIGHT: 168.06 LBS | SYSTOLIC BLOOD PRESSURE: 102 MMHG | HEART RATE: 89 BPM | RESPIRATION RATE: 16 BRPM | OXYGEN SATURATION: 97 %

## 2024-07-10 DIAGNOSIS — Z00.00 ENCOUNTER FOR GENERAL ADULT MEDICAL EXAMINATION W/OUT ABNORMAL FINDINGS: ICD-10-CM

## 2024-07-10 DIAGNOSIS — E66.9 OBESITY, UNSPECIFIED: ICD-10-CM

## 2024-07-10 DIAGNOSIS — Z13.6 ENCOUNTER FOR SCREENING FOR CARDIOVASCULAR DISORDERS: ICD-10-CM

## 2024-07-10 DIAGNOSIS — Z13.31 ENCOUNTER FOR SCREENING FOR DEPRESSION: ICD-10-CM

## 2024-07-10 DIAGNOSIS — Z29.9 ENCOUNTER FOR PROPHYLACTIC MEASURES, UNSPECIFIED: ICD-10-CM

## 2024-07-10 PROCEDURE — G0444 DEPRESSION SCREEN ANNUAL: CPT | Mod: 59

## 2024-07-10 PROCEDURE — G0447 BEHAVIOR COUNSEL OBESITY 15M: CPT

## 2024-07-10 PROCEDURE — 99385 PREV VISIT NEW AGE 18-39: CPT | Mod: 25

## 2024-07-10 PROCEDURE — 93000 ELECTROCARDIOGRAM COMPLETE: CPT | Mod: 59

## 2024-07-10 RX ORDER — TIRZEPATIDE 5 MG/.5ML
5 INJECTION, SOLUTION SUBCUTANEOUS
Qty: 1 | Refills: 1 | Status: ACTIVE | COMMUNITY
Start: 2024-07-10 | End: 1900-01-01

## 2024-07-18 LAB
25(OH)D3 SERPL-MCNC: 25.9 NG/ML
ALBUMIN SERPL ELPH-MCNC: 4.7 G/DL
ALP BLD-CCNC: 42 U/L
ALT SERPL-CCNC: 37 U/L
ANION GAP SERPL CALC-SCNC: 14 MMOL/L
AST SERPL-CCNC: 29 U/L
BILIRUB SERPL-MCNC: 0.4 MG/DL
BUN SERPL-MCNC: 12 MG/DL
CALCIUM SERPL-MCNC: 9.3 MG/DL
CHLORIDE SERPL-SCNC: 106 MMOL/L
CHOLEST SERPL-MCNC: 188 MG/DL
CO2 SERPL-SCNC: 22 MMOL/L
CREAT SERPL-MCNC: 0.8 MG/DL
EGFR: 101 ML/MIN/1.73M2
ESTIMATED AVERAGE GLUCOSE: 88 MG/DL
FOLATE SERPL-MCNC: 18.4 NG/ML
GLUCOSE SERPL-MCNC: 92 MG/DL
HBA1C MFR BLD HPLC: 4.7 %
HBV SURFACE AB SERPL IA-ACNC: >1000 MIU/ML
HCV AB SER QL: NONREACTIVE
HCV S/CO RATIO: 0.12 S/CO
HGB BLD-MCNC: 13.1 G/DL
IRON SATN MFR SERPL: 19 %
LDLC SERPL CALC-MCNC: 107 MG/DL
MCHC RBC-ENTMCNC: 27.5 PG
MCHC RBC-ENTMCNC: 29.8 GM/DL
MCV RBC AUTO: 92.2 FL
NONHDLC SERPL-MCNC: 125 MG/DL
PLATELET # BLD AUTO: 255 K/UL
POTASSIUM SERPL-SCNC: 4.5 MMOL/L
PROT SERPL-MCNC: 7.4 G/DL
RBC # BLD: 4.77 M/UL
RBC # FLD: 16.7 %
SODIUM SERPL-SCNC: 142 MMOL/L
TIBC SERPL-MCNC: 422 UG/DL
TRIGL SERPL-MCNC: 98 MG/DL
TSH SERPL-ACNC: 1.42 UIU/ML
UIBC SERPL-MCNC: 341 UG/DL
WBC # FLD AUTO: 4.45 K/UL

## 2024-08-08 ENCOUNTER — APPOINTMENT (OUTPATIENT)
Dept: INTERNAL MEDICINE | Facility: CLINIC | Age: 34
End: 2024-08-08

## 2024-08-09 ENCOUNTER — TRANSCRIPTION ENCOUNTER (OUTPATIENT)
Age: 34
End: 2024-08-09

## 2024-08-09 ENCOUNTER — APPOINTMENT (OUTPATIENT)
Dept: INTERNAL MEDICINE | Facility: CLINIC | Age: 34
End: 2024-08-09

## 2024-08-09 PROBLEM — L65.9 THINNING HAIR: Status: ACTIVE | Noted: 2024-08-09

## 2024-08-09 PROCEDURE — 99214 OFFICE O/P EST MOD 30 MIN: CPT | Mod: 25

## 2024-08-09 PROCEDURE — 36415 COLL VENOUS BLD VENIPUNCTURE: CPT

## 2024-08-09 NOTE — PHYSICAL EXAM
[No Acute Distress] : no acute distress [Well Nourished] : well nourished [Well Developed] : well developed [Well-Appearing] : well-appearing [Normal Sclera/Conjunctiva] : normal sclera/conjunctiva [PERRL] : pupils equal round and reactive to light [EOMI] : extraocular movements intact [Normal Outer Ear/Nose] : the outer ears and nose were normal in appearance [Normal Oropharynx] : the oropharynx was normal [No Lymphadenopathy] : no lymphadenopathy [Supple] : supple [Thyroid Normal, No Nodules] : the thyroid was normal and there were no nodules present [No Respiratory Distress] : no respiratory distress  [No Accessory Muscle Use] : no accessory muscle use [Clear to Auscultation] : lungs were clear to auscultation bilaterally [Normal Rate] : normal rate  [Regular Rhythm] : with a regular rhythm [Normal S1, S2] : normal S1 and S2 [No Edema] : there was no peripheral edema [Soft] : abdomen soft [Non Tender] : non-tender [Non-distended] : non-distended [Normal Bowel Sounds] : normal bowel sounds [Normal Posterior Cervical Nodes] : no posterior cervical lymphadenopathy [Normal Anterior Cervical Nodes] : no anterior cervical lymphadenopathy [No CVA Tenderness] : no CVA  tenderness [No Spinal Tenderness] : no spinal tenderness [No Joint Swelling] : no joint swelling [Grossly Normal Strength/Tone] : grossly normal strength/tone [No Rash] : no rash [Coordination Grossly Intact] : coordination grossly intact [No Focal Deficits] : no focal deficits [Normal Gait] : normal gait [Deep Tendon Reflexes (DTR)] : deep tendon reflexes were 2+ and symmetric [Normal Affect] : the affect was normal [Normal Insight/Judgement] : insight and judgment were intact [de-identified] : thin hair

## 2024-08-09 NOTE — ASSESSMENT
[FreeTextEntry1] : #Thni Hair otc biotin shampoo trial nutrafol biotin suppliments  TSH stable from july 10, 2024 no improvement, then will consider derm consult.  #Obesity BMI discussed increase zepbound to 7.5mg sub q weekly f/u 4 weeks, f/u CMP Diet Modification and Increase Physical Activity Setting realistic weight loss goals, such as a one- to two-pound weight loss per week. Eating fewer calories by cutting down on portion sizes. Aiming for at least five small handfuls of fruits and vegetables per day. 15 min spent on obesity discussion pt agrees and understands plan via teach back method. all questions answered. A total of 30 minutes including same day pre-visit chart review, face to face time with patient, history taking, physical examination, coordination of care, testing interpretation, and documentation was spent on this visit.

## 2024-08-09 NOTE — HEALTH RISK ASSESSMENT
[No] : No [No falls in past year] : Patient reported no falls in the past year [0] : 2) Feeling down, depressed, or hopeless: Not at all (0) [PHQ-2 Negative - No further assessment needed] : PHQ-2 Negative - No further assessment needed [Never] : Never [[id number]] : 0

## 2024-08-09 NOTE — HISTORY OF PRESENT ILLNESS
[de-identified] : 33 F LMP: not trying to get pregnant.  pt on weight loss medication current weight 164 from 168 current BMI 29 from 30 working with diet and exericse tolerating medication well no hypoglycemic events Pt denies any ha, dizziness, tinnitus, lightheadedness, epistaxis, vision changes, chest pain, palpitations,  sob, syncopal episodes, RAMOS, or leg swelling, n/v abdominal pain. pt feels well and has no acute complaints pt is working on diet and exercise along with weight loss medication. Pt reports struggle chronic hair thinning on head no patches of hair loss discussed shampoo and biotin supplements no further compaints.

## 2024-09-04 ENCOUNTER — APPOINTMENT (OUTPATIENT)
Dept: OBGYN | Facility: CLINIC | Age: 34
End: 2024-09-04
Payer: COMMERCIAL

## 2024-09-04 ENCOUNTER — APPOINTMENT (OUTPATIENT)
Dept: INTERNAL MEDICINE | Facility: CLINIC | Age: 34
End: 2024-09-04

## 2024-09-04 VITALS
HEART RATE: 81 BPM | OXYGEN SATURATION: 98 % | HEIGHT: 62.6 IN | DIASTOLIC BLOOD PRESSURE: 75 MMHG | TEMPERATURE: 97.9 F | SYSTOLIC BLOOD PRESSURE: 108 MMHG | BODY MASS INDEX: 28.35 KG/M2 | WEIGHT: 158 LBS

## 2024-09-04 VITALS — BODY MASS INDEX: 28.17 KG/M2 | SYSTOLIC BLOOD PRESSURE: 100 MMHG | DIASTOLIC BLOOD PRESSURE: 60 MMHG | WEIGHT: 157 LBS

## 2024-09-04 DIAGNOSIS — E66.9 OBESITY, UNSPECIFIED: ICD-10-CM

## 2024-09-04 DIAGNOSIS — R87.810 CERVICAL HIGH RISK HUMAN PAPILLOMAVIRUS (HPV) DNA TEST POSITIVE: ICD-10-CM

## 2024-09-04 PROCEDURE — 57454 BX/CURETT OF CERVIX W/SCOPE: CPT

## 2024-09-04 PROCEDURE — 99214 OFFICE O/P EST MOD 30 MIN: CPT | Mod: 25

## 2024-09-04 PROCEDURE — 99213 OFFICE O/P EST LOW 20 MIN: CPT | Mod: 25

## 2024-09-04 PROCEDURE — 36415 COLL VENOUS BLD VENIPUNCTURE: CPT

## 2024-09-04 NOTE — HISTORY OF PRESENT ILLNESS
[FreeTextEntry1] : The patient has a history of HPV positive ASCUS last year.  Colposcopy and biopsy were negative.  A repeat Pap test in August 2024 was negative but HPV positive.  She is here for evaluation.

## 2024-09-04 NOTE — ASSESSMENT
[TextEntry] : 1 year ago, the patient had HPV positive ASCUS.  Colposcopy and biopsy were negative.  Her current Pap is negative but HPV positive.  Colposcopy and cervical biopsies were done. 22 minutes of total time was spent on the date of the encounter. This included time for review of medical records and laboratory results, face to face time (including the physical examination counseling and coordination of care), time for patient education, treatment plans, answering questions, communicating with other doctors and for medical record documentation.  The time spent is separate from time spent on separately billed procedures.

## 2024-09-04 NOTE — ASSESSMENT
[FreeTextEntry1] : #Obesity BMI discussed increased zepbound to 10mg sub q weekly f/u 4 weeks f/u CMP Diet Modification and Increase Physical Activity Setting realistic weight loss goals, such as a one- to two-pound weight loss per week. Eating fewer calories by cutting down on portion sizes. Aiming for at least five small handfuls of fruits and vegetables per day. 15 min spent on obesity discussion A total of 30 minutes including same day pre-visit chart review, face to face time with patient, history taking, physical examination, coordination of care, testing interpretation, and documentation was spent on this visit.

## 2024-09-04 NOTE — HISTORY OF PRESENT ILLNESS
[de-identified] : 33 F LMP: currently on OCP spotting pt on weight loss medication zepbound 7.5mg sub q weekly tolerating well current weight 158 from 164 current BMI 28 from 29 tolerating medication well no hypoglycemic events Pt denies any ha, dizziness, tinnitus, lightheadedness, epistaxis, vision changes, chest pain, palpitations,  sob, syncopal episodes, RAMOS, or leg swelling, n/v abdominal pain. pt feels well and has no acute complaints pt is working on diet and exercise along with weight loss medication.

## 2024-09-04 NOTE — HEALTH RISK ASSESSMENT
[No] : No [No falls in past year] : Patient reported no falls in the past year [0] : 2) Feeling down, depressed, or hopeless: Not at all (0) [PHQ-2 Negative - No further assessment needed] : PHQ-2 Negative - No further assessment needed [Never] : Never [[id number]] : 0

## 2024-09-04 NOTE — PLAN
[FreeTextEntry1] : I recommended that she call for the results of her cervical biopsies in 1 week.  Management will be based on the results of the biopsies.

## 2024-09-04 NOTE — PHYSICAL EXAM
[Chaperone Present] : A chaperone was present in the examining room during all aspects of the physical examination [TextEntry] : ***General*** General Appearance: normal; Judgment and insight: normal; the patient is oriented to time, place and person; Recent and remote memory: normal; Mood and affect: normal; Respiratory effort: normal.  ***Pelvic Examination*** External genitalia: the appearance and hair distribution are normal; no lesions are appreciated. Urethra/urethral meatus: no masses or tenderness are appreciated; there is no scarring noted. Bladder: nontender; there is no fullness appreciated; no masses were palpated. Vagina: the vagina is normally rugated; no abnormal discharge is seen; no lesions are seen; pelvic support is adequate without any evidence of cystocele or rectocele. Cervix: normal in appearance; no overt lesions are seen. Uterus: Anteverted; normal in size, mobile, nontender, and well supported. Adnexa/parametria: nontender and not enlarged; no masses are palpated. A stool specimen was not indicated at this time.  ***colposcopy of the cervix*** On colposcopy of the cervix, the transformation zone was not fully visualized. Acetowhite epithelium was seen extending into the endocervical canal with denser white epithelium at 8:00. Biopsies were taken at 8:00 and 3:00.

## 2024-09-04 NOTE — PHYSICAL EXAM
[No Acute Distress] : no acute distress [Well Nourished] : well nourished [Well Developed] : well developed [Well-Appearing] : well-appearing [Normal Sclera/Conjunctiva] : normal sclera/conjunctiva [PERRL] : pupils equal round and reactive to light [EOMI] : extraocular movements intact [Normal Outer Ear/Nose] : the outer ears and nose were normal in appearance [Normal Oropharynx] : the oropharynx was normal [No Lymphadenopathy] : no lymphadenopathy [Supple] : supple [Thyroid Normal, No Nodules] : the thyroid was normal and there were no nodules present [No Respiratory Distress] : no respiratory distress  [No Accessory Muscle Use] : no accessory muscle use [Clear to Auscultation] : lungs were clear to auscultation bilaterally [Normal Rate] : normal rate  [Regular Rhythm] : with a regular rhythm [Normal S1, S2] : normal S1 and S2 [No Edema] : there was no peripheral edema [Soft] : abdomen soft [Non Tender] : non-tender [Non-distended] : non-distended [Normal Bowel Sounds] : normal bowel sounds [Normal Posterior Cervical Nodes] : no posterior cervical lymphadenopathy [Normal Anterior Cervical Nodes] : no anterior cervical lymphadenopathy [No CVA Tenderness] : no CVA  tenderness [No Spinal Tenderness] : no spinal tenderness [No Joint Swelling] : no joint swelling [Grossly Normal Strength/Tone] : grossly normal strength/tone [No Rash] : no rash [Coordination Grossly Intact] : coordination grossly intact [No Focal Deficits] : no focal deficits [Normal Gait] : normal gait [Deep Tendon Reflexes (DTR)] : deep tendon reflexes were 2+ and symmetric [Normal Affect] : the affect was normal [Normal Insight/Judgement] : insight and judgment were intact [de-identified] : thin hair

## 2024-09-05 ENCOUNTER — TRANSCRIPTION ENCOUNTER (OUTPATIENT)
Age: 34
End: 2024-09-05

## 2024-09-05 LAB
ALBUMIN SERPL ELPH-MCNC: 4.5 G/DL
ALP BLD-CCNC: 42 U/L
ALT SERPL-CCNC: 16 U/L
ANION GAP SERPL CALC-SCNC: 10 MMOL/L
AST SERPL-CCNC: 19 U/L
BILIRUB SERPL-MCNC: 0.4 MG/DL
BUN SERPL-MCNC: 10 MG/DL
CALCIUM SERPL-MCNC: 9.6 MG/DL
CHLORIDE SERPL-SCNC: 105 MMOL/L
CO2 SERPL-SCNC: 24 MMOL/L
CREAT SERPL-MCNC: 0.82 MG/DL
EGFR: 98 ML/MIN/1.73M2
GLUCOSE SERPL-MCNC: 81 MG/DL
POTASSIUM SERPL-SCNC: 4.7 MMOL/L
PROT SERPL-MCNC: 7.1 G/DL
SODIUM SERPL-SCNC: 139 MMOL/L

## 2024-09-10 LAB — CORE LAB BIOPSY: NORMAL

## 2024-09-30 ENCOUNTER — APPOINTMENT (OUTPATIENT)
Dept: INTERNAL MEDICINE | Facility: CLINIC | Age: 34
End: 2024-09-30
Payer: COMMERCIAL

## 2024-09-30 VITALS
OXYGEN SATURATION: 100 % | SYSTOLIC BLOOD PRESSURE: 107 MMHG | DIASTOLIC BLOOD PRESSURE: 71 MMHG | HEIGHT: 62.6 IN | HEART RATE: 84 BPM | BODY MASS INDEX: 26.91 KG/M2 | RESPIRATION RATE: 16 BRPM | TEMPERATURE: 98.4 F | WEIGHT: 150 LBS

## 2024-09-30 DIAGNOSIS — E66.9 OBESITY, UNSPECIFIED: ICD-10-CM

## 2024-09-30 LAB
ALBUMIN SERPL ELPH-MCNC: 4.1 G/DL
ALP BLD-CCNC: 49 U/L
ALT SERPL-CCNC: 25 U/L
ANION GAP SERPL CALC-SCNC: 12 MMOL/L
AST SERPL-CCNC: 22 U/L
BILIRUB SERPL-MCNC: 0.3 MG/DL
BUN SERPL-MCNC: 11 MG/DL
CALCIUM SERPL-MCNC: 9.1 MG/DL
CHLORIDE SERPL-SCNC: 107 MMOL/L
CO2 SERPL-SCNC: 23 MMOL/L
CREAT SERPL-MCNC: 0.81 MG/DL
EGFR: 99 ML/MIN/1.73M2
GLUCOSE SERPL-MCNC: 80 MG/DL
POTASSIUM SERPL-SCNC: 3.7 MMOL/L
PROT SERPL-MCNC: 6.8 G/DL
SODIUM SERPL-SCNC: 143 MMOL/L

## 2024-09-30 PROCEDURE — 99214 OFFICE O/P EST MOD 30 MIN: CPT

## 2024-09-30 PROCEDURE — G2211 COMPLEX E/M VISIT ADD ON: CPT

## 2024-09-30 PROCEDURE — 36415 COLL VENOUS BLD VENIPUNCTURE: CPT

## 2024-09-30 NOTE — HEALTH RISK ASSESSMENT
[No] : No [No falls in past year] : Patient reported no falls in the past year [0] : 2) Feeling down, depressed, or hopeless: Not at all (0) [PHQ-2 Negative - No further assessment needed] : PHQ-2 Negative - No further assessment needed [Never] : Never [[id number]] : 0

## 2024-09-30 NOTE — ASSESSMENT
[FreeTextEntry1] : #Obesity BMI discussed contiue diet and exercise check CMP drink plenty of fluids continue current dose of medication Diet Modification and Increase Physical Activity Setting realistic weight loss goals, such as a one- to two-pound weight loss per week. Eating fewer calories by cutting down on portion sizes. Aiming for at least five small handfuls of fruits and vegetables per day. pt agrees and understands plan via teach back method. all questions answered.

## 2024-09-30 NOTE — HISTORY OF PRESENT ILLNESS
[de-identified] : 34 F LMP: due for period - not trying to get pregnant on ocp pt on weight loss medication zepbound 10mg sub q weekly  current weight 150 from 157 current BMI 26 from 28 tolerating medication well no hypoglycemic events Pt denies any ha, dizziness, tinnitus, lightheadedness, epistaxis, vision changes, chest pain, palpitations, sob, syncopal episodes, RAMOS, or leg swelling, n/v abdominal pain. experience some diarrhea from 10mg sub 2 days after 2/3rd injection no acute diarrhea now or abdominal pain pt feels well and has no acute complaints pt is working on diet and exercise along with weight loss medication.

## 2024-09-30 NOTE — PHYSICAL EXAM
[No Acute Distress] : no acute distress [Well Nourished] : well nourished [Well Developed] : well developed [Well-Appearing] : well-appearing [Normal Sclera/Conjunctiva] : normal sclera/conjunctiva [PERRL] : pupils equal round and reactive to light [EOMI] : extraocular movements intact [Normal Outer Ear/Nose] : the outer ears and nose were normal in appearance [Normal Oropharynx] : the oropharynx was normal [No Lymphadenopathy] : no lymphadenopathy [Supple] : supple [Thyroid Normal, No Nodules] : the thyroid was normal and there were no nodules present [No Respiratory Distress] : no respiratory distress  [No Accessory Muscle Use] : no accessory muscle use [Clear to Auscultation] : lungs were clear to auscultation bilaterally [Normal Rate] : normal rate  [Regular Rhythm] : with a regular rhythm [Normal S1, S2] : normal S1 and S2 [No Edema] : there was no peripheral edema [Soft] : abdomen soft [Non Tender] : non-tender [Non-distended] : non-distended [Normal Bowel Sounds] : normal bowel sounds [Normal Posterior Cervical Nodes] : no posterior cervical lymphadenopathy [Normal Anterior Cervical Nodes] : no anterior cervical lymphadenopathy [No CVA Tenderness] : no CVA  tenderness [No Spinal Tenderness] : no spinal tenderness [No Joint Swelling] : no joint swelling [Grossly Normal Strength/Tone] : grossly normal strength/tone [No Rash] : no rash [Coordination Grossly Intact] : coordination grossly intact [No Focal Deficits] : no focal deficits [Normal Gait] : normal gait [Deep Tendon Reflexes (DTR)] : deep tendon reflexes were 2+ and symmetric [Normal Affect] : the affect was normal [Normal Insight/Judgement] : insight and judgment were intact

## 2024-10-28 ENCOUNTER — APPOINTMENT (OUTPATIENT)
Dept: INTERNAL MEDICINE | Facility: CLINIC | Age: 34
End: 2024-10-28
Payer: COMMERCIAL

## 2024-10-28 ENCOUNTER — APPOINTMENT (OUTPATIENT)
Dept: INTERNAL MEDICINE | Facility: CLINIC | Age: 34
End: 2024-10-28

## 2024-10-28 VITALS
SYSTOLIC BLOOD PRESSURE: 90 MMHG | HEIGHT: 62 IN | HEART RATE: 81 BPM | OXYGEN SATURATION: 100 % | TEMPERATURE: 97.7 F | DIASTOLIC BLOOD PRESSURE: 70 MMHG | BODY MASS INDEX: 27.6 KG/M2 | WEIGHT: 150 LBS | RESPIRATION RATE: 16 BRPM

## 2024-10-28 DIAGNOSIS — Z20.818 CONTACT WITH AND (SUSPECTED) EXPOSURE TO OTHER BACTERIAL COMMUNICABLE DISEASES: ICD-10-CM

## 2024-10-28 DIAGNOSIS — E66.9 OBESITY, UNSPECIFIED: ICD-10-CM

## 2024-10-28 DIAGNOSIS — J02.9 ACUTE PHARYNGITIS, UNSPECIFIED: ICD-10-CM

## 2024-10-28 PROCEDURE — G2211 COMPLEX E/M VISIT ADD ON: CPT

## 2024-10-28 PROCEDURE — 36415 COLL VENOUS BLD VENIPUNCTURE: CPT

## 2024-10-28 PROCEDURE — 87880 STREP A ASSAY W/OPTIC: CPT | Mod: QW

## 2024-10-28 PROCEDURE — 99214 OFFICE O/P EST MOD 30 MIN: CPT

## 2024-10-28 RX ORDER — AMOXICILLIN 500 MG/1
500 CAPSULE ORAL
Qty: 20 | Refills: 0 | Status: ACTIVE | COMMUNITY
Start: 2024-10-28 | End: 1900-01-01

## 2024-10-29 LAB
ALBUMIN SERPL ELPH-MCNC: 4.2 G/DL
ALP BLD-CCNC: 38 U/L
ALT SERPL-CCNC: 14 U/L
ANION GAP SERPL CALC-SCNC: 11 MMOL/L
AST SERPL-CCNC: 16 U/L
BILIRUB SERPL-MCNC: 0.3 MG/DL
BUN SERPL-MCNC: 9 MG/DL
CALCIUM SERPL-MCNC: 9.4 MG/DL
CHLORIDE SERPL-SCNC: 105 MMOL/L
CO2 SERPL-SCNC: 25 MMOL/L
CREAT SERPL-MCNC: 0.77 MG/DL
EGFR: 105 ML/MIN/1.73M2
GLUCOSE SERPL-MCNC: 83 MG/DL
POTASSIUM SERPL-SCNC: 4.2 MMOL/L
PROT SERPL-MCNC: 6.7 G/DL
SODIUM SERPL-SCNC: 140 MMOL/L

## 2024-10-30 LAB — BACTERIA THROAT CULT: NORMAL

## 2024-11-12 ENCOUNTER — NON-APPOINTMENT (OUTPATIENT)
Age: 34
End: 2024-11-12

## 2024-12-03 ENCOUNTER — APPOINTMENT (OUTPATIENT)
Dept: INTERNAL MEDICINE | Facility: CLINIC | Age: 34
End: 2024-12-03

## 2024-12-04 ENCOUNTER — TRANSCRIPTION ENCOUNTER (OUTPATIENT)
Age: 34
End: 2024-12-04

## 2025-01-08 ENCOUNTER — APPOINTMENT (OUTPATIENT)
Dept: INTERNAL MEDICINE | Facility: CLINIC | Age: 35
End: 2025-01-08
Payer: COMMERCIAL

## 2025-01-08 VITALS
OXYGEN SATURATION: 100 % | BODY MASS INDEX: 25.03 KG/M2 | SYSTOLIC BLOOD PRESSURE: 108 MMHG | TEMPERATURE: 98.3 F | HEART RATE: 91 BPM | DIASTOLIC BLOOD PRESSURE: 74 MMHG | HEIGHT: 62 IN | WEIGHT: 136 LBS

## 2025-01-08 DIAGNOSIS — Z29.9 ENCOUNTER FOR PROPHYLACTIC MEASURES, UNSPECIFIED: ICD-10-CM

## 2025-01-08 DIAGNOSIS — Z76.89 PERSONS ENCOUNTERING HEALTH SERVICES IN OTHER SPECIFIED CIRCUMSTANCES: ICD-10-CM

## 2025-01-08 PROCEDURE — 36415 COLL VENOUS BLD VENIPUNCTURE: CPT

## 2025-01-08 PROCEDURE — 99213 OFFICE O/P EST LOW 20 MIN: CPT

## 2025-01-17 ENCOUNTER — TRANSCRIPTION ENCOUNTER (OUTPATIENT)
Age: 35
End: 2025-01-17

## 2025-01-17 DIAGNOSIS — R74.01 ELEVATION OF LEVELS OF LIVER TRANSAMINASE LEVELS: ICD-10-CM

## 2025-01-17 LAB
ALBUMIN SERPL ELPH-MCNC: 4.5 G/DL
ALP BLD-CCNC: 74 U/L
ALT SERPL-CCNC: 77 U/L
ANION GAP SERPL CALC-SCNC: 11 MMOL/L
AST SERPL-CCNC: 45 U/L
BILIRUB SERPL-MCNC: 0.5 MG/DL
BUN SERPL-MCNC: 13 MG/DL
CALCIUM SERPL-MCNC: 9.5 MG/DL
CHLORIDE SERPL-SCNC: 105 MMOL/L
CO2 SERPL-SCNC: 25 MMOL/L
CREAT SERPL-MCNC: 0.79 MG/DL
EGFR: 102 ML/MIN/1.73M2
GLUCOSE SERPL-MCNC: 84 MG/DL
POTASSIUM SERPL-SCNC: 4.2 MMOL/L
PROT SERPL-MCNC: 7.3 G/DL
SODIUM SERPL-SCNC: 140 MMOL/L

## 2025-02-05 ENCOUNTER — APPOINTMENT (OUTPATIENT)
Dept: INTERNAL MEDICINE | Facility: CLINIC | Age: 35
End: 2025-02-05
Payer: COMMERCIAL

## 2025-02-05 DIAGNOSIS — Z76.89 PERSONS ENCOUNTERING HEALTH SERVICES IN OTHER SPECIFIED CIRCUMSTANCES: ICD-10-CM

## 2025-02-05 DIAGNOSIS — R74.01 ELEVATION OF LEVELS OF LIVER TRANSAMINASE LEVELS: ICD-10-CM

## 2025-02-05 PROCEDURE — 99213 OFFICE O/P EST LOW 20 MIN: CPT | Mod: 95

## 2025-03-11 ENCOUNTER — TRANSCRIPTION ENCOUNTER (OUTPATIENT)
Age: 35
End: 2025-03-11

## 2025-03-20 ENCOUNTER — TRANSCRIPTION ENCOUNTER (OUTPATIENT)
Age: 35
End: 2025-03-20

## 2025-04-15 ENCOUNTER — APPOINTMENT (OUTPATIENT)
Dept: INTERNAL MEDICINE | Facility: CLINIC | Age: 35
End: 2025-04-15
Payer: COMMERCIAL

## 2025-04-15 DIAGNOSIS — Z76.89 PERSONS ENCOUNTERING HEALTH SERVICES IN OTHER SPECIFIED CIRCUMSTANCES: ICD-10-CM

## 2025-04-15 PROCEDURE — 99213 OFFICE O/P EST LOW 20 MIN: CPT | Mod: 93

## 2025-05-13 ENCOUNTER — APPOINTMENT (OUTPATIENT)
Dept: INTERNAL MEDICINE | Facility: CLINIC | Age: 35
End: 2025-05-13
Payer: COMMERCIAL

## 2025-05-13 DIAGNOSIS — Z76.89 PERSONS ENCOUNTERING HEALTH SERVICES IN OTHER SPECIFIED CIRCUMSTANCES: ICD-10-CM

## 2025-05-13 PROCEDURE — 99213 OFFICE O/P EST LOW 20 MIN: CPT | Mod: 93

## 2025-06-11 ENCOUNTER — APPOINTMENT (OUTPATIENT)
Dept: INTERNAL MEDICINE | Facility: CLINIC | Age: 35
End: 2025-06-11
Payer: COMMERCIAL

## 2025-06-11 PROCEDURE — 99213 OFFICE O/P EST LOW 20 MIN: CPT | Mod: 95

## 2025-07-15 ENCOUNTER — APPOINTMENT (OUTPATIENT)
Dept: INTERNAL MEDICINE | Facility: CLINIC | Age: 35
End: 2025-07-15
Payer: COMMERCIAL

## 2025-07-15 PROCEDURE — 99213 OFFICE O/P EST LOW 20 MIN: CPT | Mod: 95

## 2025-07-30 ENCOUNTER — LABORATORY RESULT (OUTPATIENT)
Age: 35
End: 2025-07-30

## 2025-07-30 ENCOUNTER — APPOINTMENT (OUTPATIENT)
Dept: INTERNAL MEDICINE | Facility: CLINIC | Age: 35
End: 2025-07-30
Payer: COMMERCIAL

## 2025-07-30 VITALS
OXYGEN SATURATION: 98 % | HEART RATE: 83 BPM | WEIGHT: 131.25 LBS | RESPIRATION RATE: 16 BRPM | HEIGHT: 62 IN | BODY MASS INDEX: 24.15 KG/M2 | TEMPERATURE: 98.3 F

## 2025-07-30 VITALS — SYSTOLIC BLOOD PRESSURE: 97 MMHG | DIASTOLIC BLOOD PRESSURE: 60 MMHG

## 2025-07-30 DIAGNOSIS — E55.9 VITAMIN D DEFICIENCY, UNSPECIFIED: ICD-10-CM

## 2025-07-30 DIAGNOSIS — Z00.00 ENCOUNTER FOR GENERAL ADULT MEDICAL EXAMINATION W/OUT ABNORMAL FINDINGS: ICD-10-CM

## 2025-07-30 DIAGNOSIS — Z76.89 PERSONS ENCOUNTERING HEALTH SERVICES IN OTHER SPECIFIED CIRCUMSTANCES: ICD-10-CM

## 2025-07-30 PROCEDURE — 99395 PREV VISIT EST AGE 18-39: CPT

## 2025-07-30 PROCEDURE — 36415 COLL VENOUS BLD VENIPUNCTURE: CPT

## 2025-08-05 ENCOUNTER — TRANSCRIPTION ENCOUNTER (OUTPATIENT)
Age: 35
End: 2025-08-05

## 2025-08-05 LAB
25(OH)D3 SERPL-MCNC: 36.8 NG/ML
ALBUMIN SERPL ELPH-MCNC: 4.4 G/DL
ALP BLD-CCNC: 36 U/L
ALT SERPL-CCNC: 14 U/L
ANION GAP SERPL CALC-SCNC: 14 MMOL/L
APPEARANCE: CLEAR
AST SERPL-CCNC: 20 U/L
BASOPHILS # BLD AUTO: 0.03 K/UL
BASOPHILS NFR BLD AUTO: 0.6 %
BILIRUB SERPL-MCNC: 0.3 MG/DL
BILIRUBIN URINE: NEGATIVE
BLOOD URINE: ABNORMAL
BUN SERPL-MCNC: 10 MG/DL
C TRACH RRNA SPEC QL NAA+PROBE: NOT DETECTED
CALCIUM SERPL-MCNC: 9.6 MG/DL
CHLORIDE SERPL-SCNC: 105 MMOL/L
CHOLEST SERPL-MCNC: 182 MG/DL
CO2 SERPL-SCNC: 21 MMOL/L
COLOR: YELLOW
CREAT SERPL-MCNC: 0.7 MG/DL
EGFRCR SERPLBLD CKD-EPI 2021: 118 ML/MIN/1.73M2
EOSINOPHIL # BLD AUTO: 0.04 K/UL
EOSINOPHIL NFR BLD AUTO: 0.8 %
ESTIMATED AVERAGE GLUCOSE: 85 MG/DL
FOLATE SERPL-MCNC: 9.2 NG/ML
GLUCOSE QUALITATIVE U: NEGATIVE MG/DL
GLUCOSE SERPL-MCNC: 82 MG/DL
HBA1C MFR BLD HPLC: 4.6 %
HCT VFR BLD CALC: 38.3 %
HCV AB SER QL: NONREACTIVE
HCV S/CO RATIO: 0.11 S/CO
HDLC SERPL-MCNC: 72 MG/DL
HGB BLD-MCNC: 12.6 G/DL
HIV1+2 AB SPEC QL IA.RAPID: NONREACTIVE
IMM GRANULOCYTES NFR BLD AUTO: 0.2 %
KETONES URINE: NEGATIVE MG/DL
LDLC SERPL-MCNC: 94 MG/DL
LEUKOCYTE ESTERASE URINE: NEGATIVE
LYMPHOCYTES # BLD AUTO: 1.43 K/UL
LYMPHOCYTES NFR BLD AUTO: 30.3 %
MAN DIFF?: NORMAL
MCHC RBC-ENTMCNC: 28.7 PG
MCHC RBC-ENTMCNC: 32.9 G/DL
MCV RBC AUTO: 87.2 FL
MONOCYTES # BLD AUTO: 0.25 K/UL
MONOCYTES NFR BLD AUTO: 5.3 %
N GONORRHOEA RRNA SPEC QL NAA+PROBE: NOT DETECTED
NEUTROPHILS # BLD AUTO: 2.96 K/UL
NEUTROPHILS NFR BLD AUTO: 62.8 %
NITRITE URINE: NEGATIVE
NONHDLC SERPL-MCNC: 110 MG/DL
PH URINE: 7
PLATELET # BLD AUTO: 241 K/UL
POTASSIUM SERPL-SCNC: 4.1 MMOL/L
PROT SERPL-MCNC: 7.1 G/DL
PROTEIN URINE: NEGATIVE MG/DL
RBC # BLD: 4.39 M/UL
RBC # FLD: 14.2 %
SODIUM SERPL-SCNC: 139 MMOL/L
SOURCE AMPLIFICATION: NORMAL
SPECIFIC GRAVITY URINE: 1.02
T PALLIDUM AB SER QL IA: NEGATIVE
TRIGL SERPL-MCNC: 92 MG/DL
TSH SERPL-ACNC: 1.73 UIU/ML
UROBILINOGEN URINE: 0.2 MG/DL
VIT B12 SERPL-MCNC: 531 PG/ML
WBC # FLD AUTO: 4.72 K/UL

## 2025-09-17 ENCOUNTER — APPOINTMENT (OUTPATIENT)
Dept: INTERNAL MEDICINE | Facility: CLINIC | Age: 35
End: 2025-09-17
Payer: COMMERCIAL

## 2025-09-17 DIAGNOSIS — Z76.89 PERSONS ENCOUNTERING HEALTH SERVICES IN OTHER SPECIFIED CIRCUMSTANCES: ICD-10-CM

## 2025-09-17 PROCEDURE — 99213 OFFICE O/P EST LOW 20 MIN: CPT | Mod: 95

## (undated) DEVICE — TROCAR COVIDIEN VERSAONE BLADED FIXATION 11MM STANDARD

## (undated) DEVICE — ELCTR BOVIE TIP BLADE INSULATED 2.75" EDGE

## (undated) DEVICE — DISSECTOR ENDOSCOPIC KITTNER SINGLE TIP

## (undated) DEVICE — SOL IRR POUR NS 0.9% 1000ML

## (undated) DEVICE — WARMING BLANKET UPPER ADULT

## (undated) DEVICE — ELCTR BOVIE PENCIL SMOKE EVACUATION

## (undated) DEVICE — SUT MONOCRYL 4-0 27" PS-2 UNDYED

## (undated) DEVICE — TROCAR COVIDIEN VERSAONE BLUNT TIP HASSAN 12MM

## (undated) DEVICE — PREP BETADINE KIT

## (undated) DEVICE — VENODYNE/SCD SLEEVE CALF MEDIUM

## (undated) DEVICE — TUBING HYDRO-SURG PLUS IRRIGATOR W SMOKEVAC & PROBE

## (undated) DEVICE — BAG SPECIMEN RETRIEVAL ENDOBAG 3X6"

## (undated) DEVICE — BASIN SET DOUBLE

## (undated) DEVICE — PROTECTOR HEEL / ELBOW

## (undated) DEVICE — TIP METZENBAUM SCISSOR MONOPOLAR ENDOCUT (ORANGE)

## (undated) DEVICE — PACK GENERAL LAPAROSCOPY

## (undated) DEVICE — TUBING INSUFFLATION LAP FILTER 10FT

## (undated) DEVICE — NDL HYPO REGULAR BEVEL 22G X 1.5" (TURQUOISE)

## (undated) DEVICE — ELCTR GROUNDING PAD ADULT COVIDIEN

## (undated) DEVICE — BLADE SURGICAL #15 CARBON

## (undated) DEVICE — CANISTER DISPOSABLE THIN WALL 3000CC

## (undated) DEVICE — POSITIONER STRAP ARMBOARD VELCRO TS-30

## (undated) DEVICE — TUBING OLYMPUS INSUFFLATION

## (undated) DEVICE — SYR LUER LOK 20CC

## (undated) DEVICE — DRAPE 3/4 SHEET 52X76"

## (undated) DEVICE — D HELP - CLEARVIEW CLEARIFY SYSTEM

## (undated) DEVICE — GLV 6 PROTEXIS (WHITE)

## (undated) DEVICE — ELCTR REM POLYHESIVE PATIENT RETURN ELECTRODE ADULT

## (undated) DEVICE — TROCAR COVIDIEN VERSAPORT BLADELESS OPTICAL 5MM STANDARD

## (undated) DEVICE — SOL IRR POUR H2O 500ML

## (undated) DEVICE — SUT VICRYL 0 27" UR-6